# Patient Record
Sex: MALE | Race: WHITE | HISPANIC OR LATINO | Employment: UNEMPLOYED | URBAN - METROPOLITAN AREA
[De-identification: names, ages, dates, MRNs, and addresses within clinical notes are randomized per-mention and may not be internally consistent; named-entity substitution may affect disease eponyms.]

---

## 2023-02-14 ENCOUNTER — HOSPITAL ENCOUNTER (EMERGENCY)
Facility: HOSPITAL | Age: 28
Discharge: HOME | End: 2023-02-14
Attending: EMERGENCY MEDICINE | Admitting: EMERGENCY MEDICINE

## 2023-02-14 VITALS
DIASTOLIC BLOOD PRESSURE: 84 MMHG | OXYGEN SATURATION: 98 % | HEART RATE: 103 BPM | WEIGHT: 195.99 LBS | RESPIRATION RATE: 16 BRPM | TEMPERATURE: 98.5 F | SYSTOLIC BLOOD PRESSURE: 152 MMHG

## 2023-02-14 DIAGNOSIS — Z48.02 ENCOUNTER FOR STAPLE REMOVAL: Primary | ICD-10-CM

## 2023-02-14 NOTE — ED PROVIDER NOTES
History  Chief Complaint   Patient presents with   • Suture / Staple Removal     States he needs staples removed from his head     33 y/o male, 5 staples in right scalp  Placed 1/28 at [facility]  No fevers, chills, purulence, headaches  Requesting removal see procedure note          None       History reviewed  No pertinent past medical history  History reviewed  No pertinent surgical history  History reviewed  No pertinent family history  I have reviewed and agree with the history as documented  E-Cigarette/Vaping   • E-Cigarette Use Never User      E-Cigarette/Vaping Substances     Social History     Tobacco Use   • Smoking status: Never     Passive exposure: Never   • Smokeless tobacco: Never   Vaping Use   • Vaping Use: Never used   Substance Use Topics   • Alcohol use: Yes     Comment: EOD   • Drug use: Never       Review of Systems   Constitutional: Negative for chills, fatigue and fever  HENT: Negative for congestion, ear pain, rhinorrhea and sore throat  Eyes: Negative for redness  Respiratory: Negative for chest tightness and shortness of breath  Cardiovascular: Negative for chest pain and palpitations  Gastrointestinal: Negative for abdominal pain, nausea and vomiting  Genitourinary: Negative for dysuria and hematuria  Musculoskeletal: Negative  Skin: Positive for wound  Negative for rash  Neurological: Negative for dizziness, syncope, light-headedness and numbness  Physical Exam  Physical Exam  Vitals and nursing note reviewed  Constitutional:       Appearance: Normal appearance  He is well-developed  HENT:      Head: Normocephalic and atraumatic  Eyes:      General: No scleral icterus  Pupils: Pupils are equal, round, and reactive to light  Cardiovascular:      Rate and Rhythm: Normal rate and regular rhythm  Pulses: Normal pulses  Pulmonary:      Effort: Pulmonary effort is normal  No respiratory distress  Breath sounds: No stridor  Abdominal:      General: There is no distension  Palpations: There is no mass  Musculoskeletal:      Cervical back: Normal range of motion  Skin:     General: Skin is warm and dry  Capillary Refill: Capillary refill takes less than 2 seconds  Coloration: Skin is not jaundiced  Neurological:      Mental Status: He is alert and oriented to person, place, and time  Gait: Gait normal    Psychiatric:         Mood and Affect: Mood normal          Vital Signs  ED Triage Vitals [02/14/23 1054]   Temperature Pulse Respirations Blood Pressure SpO2   98 5 °F (36 9 °C) 103 16 152/84 98 %      Temp Source Heart Rate Source Patient Position - Orthostatic VS BP Location FiO2 (%)   Tympanic Monitor Sitting Left arm --      Pain Score       --           Vitals:    02/14/23 1054   BP: 152/84   Pulse: 103   Patient Position - Orthostatic VS: Sitting         Visual Acuity      ED Medications  Medications - No data to display    Diagnostic Studies  Results Reviewed     None                 No orders to display              Procedures  Suture removal    Date/Time: 2/14/2023 11:16 AM  Performed by: José Luis Noel PA-C  Authorized by: José Luis Noel PA-C   Universal Protocol:  Consent: Verbal consent obtained  Consent given by: patient  Patient understanding: patient states understanding of the procedure being performed  Patient identity confirmed: verbally with patient        Patient location:  ED  Location:     Laterality:  Right    Location:  [address] location:  Scalp  Procedure details: Tools used: Other (comment)    Wound appearance:  No sign(s) of infection, good wound healing and clean    Number of staples removed:  5  Post-procedure details:     Post-removal:  No dressing applied    Patient tolerance of procedure:   Tolerated well, no immediate complications             ED Course                               SBIRT 20yo+    Flowsheet Row Most Recent Value   SBIRT (25 yo +) In order to provide better care to our patients, we are screening all of our patients for alcohol and drug use  Would it be okay to ask you these screening questions? No Filed at: 02/14/2023 1102                    Medical Decision Making  5 staples removed from scalp  See procedure note  No evidence of infection  Strict return to ED precautions discussed  Patient and/or family members verbalizes understanding and agrees with plan  Patient is stable for discharge     Portions of the record may have been created with voice recognition software  Occasional wrong word or "sound a like" substitutions may have occurred due to the inherent limitations of voice recognition software  Read the chart carefully and recognize, using context, where substitutions have occurred  Encounter for staple removal: acute illness or injury      Disposition  Final diagnoses:   Encounter for staple removal     Time reflects when diagnosis was documented in both MDM as applicable and the Disposition within this note     Time User Action Codes Description Comment    2/14/2023 11:12 AM Rodney Shen Add [W14 67] Encounter for staple removal       ED Disposition     ED Disposition   Discharge    Condition   Good    Date/Time   Tue Feb 14, 2023 11:12 AM    Comment   Zackary Perez discharge to home/self care  Follow-up Information     Follow up With Specialties Details Why Contact Info    Tea Champagne MD Family Medicine Schedule an appointment as soon as possible for a visit  As needed [address]  [phone]            There are no discharge medications for this patient  No discharge procedures on file      PDMP Review     None          ED Provider  Electronically Signed by           Stephany Smith PA-C  02/14/23 8932

## 2023-02-14 NOTE — ED ATTENDING ATTESTATION
I was the attending physician on duty at the time the patient visited the emergency department  The patient was evaluated by the Advanced Practitioner  I was personally available for consultation; however the patient’s care, medical decisions and disposition fell within the Advanced Practitioner’s scope of practice to independently manage the patient, unless otherwise noted      Jeni Mohr MD

## 2023-06-08 ENCOUNTER — OFFICE VISIT (OUTPATIENT)
Dept: FAMILY MEDICINE CLINIC | Facility: CLINIC | Age: 28
End: 2023-06-08

## 2023-06-08 VITALS
BODY MASS INDEX: 31.33 KG/M2 | DIASTOLIC BLOOD PRESSURE: 80 MMHG | HEIGHT: 67 IN | HEART RATE: 92 BPM | SYSTOLIC BLOOD PRESSURE: 124 MMHG | OXYGEN SATURATION: 99 % | WEIGHT: 199.6 LBS | TEMPERATURE: 98 F | RESPIRATION RATE: 18 BRPM

## 2023-06-08 DIAGNOSIS — E66.09 CLASS 1 OBESITY DUE TO EXCESS CALORIES WITHOUT SERIOUS COMORBIDITY WITH BODY MASS INDEX (BMI) OF 31.0 TO 31.9 IN ADULT: ICD-10-CM

## 2023-06-08 DIAGNOSIS — Z02.4 DRIVER'S PERMIT PE (PHYSICAL EXAMINATION): Primary | ICD-10-CM

## 2023-06-08 PROCEDURE — 99203 OFFICE O/P NEW LOW 30 MIN: CPT | Performed by: FAMILY MEDICINE

## 2023-06-08 NOTE — PROGRESS NOTES
Name: Hussein Em      : 1995      MRN: [id number]  Encounter Provider: Kit Patel MD  Encounter Date: 2023   Encounter department: [address] [place] [address]  's permit PE (physical examination)  Assessment & Plan:  -No contraindication for driving  -Safe driving counseling provided      2  Class 1 obesity due to excess calories without serious comorbidity with body mass index (BMI) of 31 0 to 31 9 in adult  Assessment & Plan:  -Dietary and exercise counseling provided      BMI Counseling: Body mass index is 31 26 kg/m²  The BMI is above normal  Nutrition recommendations include decreasing portion sizes, encouraging healthy choices of fruits and vegetables, decreasing fast food intake, consuming healthier snacks, limiting drinks that contain sugar, moderation in carbohydrate intake and reducing intake of cholesterol  Exercise recommendations include moderate physical activity 150 minutes/week and obtaining a gym membership  Rationale for BMI follow-up plan is due to patient being overweight or obese  Depression Screening and Follow-up Plan: Patient was screened for depression during today's encounter  They screened negative with a PHQ-2 score of 0  Subjective     31 y/o male with no significant PMH who presents today to establish care and for  physical  He has no acute concerns today  He denies any history of psychiatry disorder or epilepsy  He denies any history of drug and alcohol abuse  Review of Systems   Constitutional: Negative for appetite change, chills, fatigue and fever  HENT: Negative for congestion, ear discharge and ear pain  Eyes: Negative for pain and redness  Respiratory: Negative for cough, shortness of breath and wheezing  Cardiovascular: Negative for chest pain, palpitations and leg swelling     Gastrointestinal: Negative for abdominal distention, abdominal pain, blood in stool, "constipation, diarrhea, nausea and vomiting  Endocrine: Negative for polydipsia, polyphagia and polyuria  Genitourinary: Negative for difficulty urinating, dysuria, frequency, hematuria and urgency  Musculoskeletal: Negative for arthralgias, back pain, myalgias and neck pain  Neurological: Negative for dizziness, tremors, weakness, light-headedness, numbness and headaches  History reviewed  No pertinent past medical history  History reviewed  No pertinent surgical history  Family History   Problem Relation Age of Onset   • No Known Problems Mother    • No Known Problems Father    • No Known Problems Sister    • No Known Problems Brother    • No Known Problems Son      Social History     Socioeconomic History   • Marital status: Single     Spouse name: None   • Number of children: None   • Years of education: None   • Highest education level: None   Occupational History   • None   Tobacco Use   • Smoking status: Never     Passive exposure: Never   • Smokeless tobacco: Never   Vaping Use   • Vaping Use: Never used   Substance and Sexual Activity   • Alcohol use: Yes     Comment: EOD   • Drug use: Never   • Sexual activity: None   Other Topics Concern   • None   Social History Narrative   • None     Social Determinants of Health     Financial Resource Strain: Not on file   Food Insecurity: Not on file   Transportation Needs: Not on file   Physical Activity: Not on file   Stress: Not on file   Social Connections: Not on file   Intimate Partner Violence: Not on file   Housing Stability: Not on file     No current outpatient medications on file prior to visit  No Known Allergies    There is no immunization history on file for this patient      Objective     /80 (BP Location: Right arm, Patient Position: Sitting, Cuff Size: Standard)   Pulse 92   Temp 98 °F (36 7 °C) (Temporal)   Resp 18   Ht 5' 7\" (1 702 m)   Wt 90 5 kg (199 lb 9 6 oz)   SpO2 99%   BMI 31 26 kg/m²     Physical " Exam  Constitutional:       General: He is not in acute distress  Appearance: Normal appearance  He is well-developed  He is not ill-appearing, toxic-appearing or diaphoretic  HENT:      Head: Normocephalic and atraumatic  Right Ear: External ear normal       Left Ear: External ear normal       Nose: Nose normal       Mouth/Throat:      Pharynx: No oropharyngeal exudate or posterior oropharyngeal erythema  Eyes:      General: No scleral icterus  Right eye: No discharge  Left eye: No discharge  Extraocular Movements: Extraocular movements intact  Conjunctiva/sclera: Conjunctivae normal    Cardiovascular:      Rate and Rhythm: Normal rate and regular rhythm  Heart sounds: Normal heart sounds  No murmur heard  No friction rub  No gallop  Pulmonary:      Effort: Pulmonary effort is normal  No respiratory distress  Breath sounds: Normal breath sounds  No stridor  No wheezing or rhonchi  Abdominal:      General: Bowel sounds are normal  There is no distension  Palpations: Abdomen is soft  There is no mass  Tenderness: There is no abdominal tenderness  There is no guarding or rebound  Hernia: No hernia is present  Musculoskeletal:         General: No deformity or signs of injury  Normal range of motion  Cervical back: Normal range of motion and neck supple  Right lower leg: No edema  Left lower leg: No edema  Lymphadenopathy:      Cervical: No cervical adenopathy  Skin:     General: Skin is warm  Capillary Refill: Capillary refill takes less than 2 seconds  Findings: No bruising, erythema, lesion or rash  Neurological:      General: No focal deficit present  Mental Status: He is alert and oriented to person, place, and time  Cranial Nerves: No cranial nerve deficit  Sensory: No sensory deficit  Motor: No weakness        Coordination: Coordination normal    Psychiatric:         Mood and Affect: Mood normal        Kal Hagan MD

## 2023-06-28 ENCOUNTER — HOSPITAL ENCOUNTER (INPATIENT)
Facility: HOSPITAL | Age: 28
LOS: 2 days | Discharge: HOME | End: 2023-06-30
Attending: INTERNAL MEDICINE | Admitting: EMERGENCY MEDICINE
Payer: COMMERCIAL

## 2023-06-28 DIAGNOSIS — F10.10 ALCOHOL ABUSE: Primary | ICD-10-CM

## 2023-06-28 PROBLEM — F10.939 ALCOHOL WITHDRAWAL SYNDROME WITH COMPLICATION (HCC): Status: ACTIVE | Noted: 2023-06-28

## 2023-06-28 PROBLEM — F32.A DEPRESSION: Status: ACTIVE | Noted: 2023-06-28

## 2023-06-28 PROBLEM — F10.929 ALCOHOL INTOXICATION (HCC): Status: ACTIVE | Noted: 2023-06-28

## 2023-06-28 PROBLEM — F10.20 ALCOHOL USE DISORDER, SEVERE, DEPENDENCE (HCC): Status: ACTIVE | Noted: 2023-06-28

## 2023-06-28 PROBLEM — R79.89 ELEVATED LFTS: Status: ACTIVE | Noted: 2023-06-28

## 2023-06-28 LAB
ALBUMIN SERPL BCP-MCNC: 4.2 G/DL (ref 3.5–5)
ALP SERPL-CCNC: 69 U/L (ref 34–104)
ALT SERPL W P-5'-P-CCNC: 53 U/L (ref 7–52)
AMPHETAMINES SERPL QL SCN: NEGATIVE
ANION GAP SERPL CALCULATED.3IONS-SCNC: 12 MMOL/L
APAP SERPL-MCNC: <10 UG/ML (ref 10–20)
AST SERPL W P-5'-P-CCNC: 64 U/L (ref 13–39)
ATRIAL RATE: 103 BPM
BARBITURATES UR QL: NEGATIVE
BASOPHILS # BLD AUTO: 0.05 THOUSANDS/ÂΜL (ref 0–0.1)
BASOPHILS NFR BLD AUTO: 1 % (ref 0–1)
BENZODIAZ UR QL: NEGATIVE
BILIRUB SERPL-MCNC: 0.29 MG/DL (ref 0.2–1)
BUN SERPL-MCNC: 11 MG/DL (ref 5–25)
CALCIUM SERPL-MCNC: 8.5 MG/DL (ref 8.4–10.2)
CHLORIDE SERPL-SCNC: 109 MMOL/L (ref 96–108)
CO2 SERPL-SCNC: 24 MMOL/L (ref 21–32)
COCAINE UR QL: NEGATIVE
CREAT SERPL-MCNC: 0.8 MG/DL (ref 0.6–1.3)
EOSINOPHIL # BLD AUTO: 0.05 THOUSAND/ÂΜL (ref 0–0.61)
EOSINOPHIL NFR BLD AUTO: 1 % (ref 0–6)
ERYTHROCYTE [DISTWIDTH] IN BLOOD BY AUTOMATED COUNT: 13.1 % (ref 11.6–15.1)
ETHANOL SERPL-MCNC: 462 MG/DL
GFR SERPL CREATININE-BSD FRML MDRD: 122 ML/MIN/1.73SQ M
GLUCOSE SERPL-MCNC: 115 MG/DL (ref 65–140)
HCT VFR BLD AUTO: 45.9 % (ref 36.5–49.3)
HGB BLD-MCNC: 15.2 G/DL (ref 12–17)
IMM GRANULOCYTES # BLD AUTO: 0.02 THOUSAND/UL (ref 0–0.2)
IMM GRANULOCYTES NFR BLD AUTO: 0 % (ref 0–2)
LYMPHOCYTES # BLD AUTO: 2.84 THOUSANDS/ÂΜL (ref 0.6–4.47)
LYMPHOCYTES NFR BLD AUTO: 38 % (ref 14–44)
MAGNESIUM SERPL-MCNC: 2.1 MG/DL (ref 1.9–2.7)
MCH RBC QN AUTO: 31.8 PG (ref 26.8–34.3)
MCHC RBC AUTO-ENTMCNC: 33.1 G/DL (ref 31.4–37.4)
MCV RBC AUTO: 96 FL (ref 82–98)
METHADONE UR QL: NEGATIVE
MONOCYTES # BLD AUTO: 0.7 THOUSAND/ÂΜL (ref 0.17–1.22)
MONOCYTES NFR BLD AUTO: 9 % (ref 4–12)
NEUTROPHILS # BLD AUTO: 3.83 THOUSANDS/ÂΜL (ref 1.85–7.62)
NEUTS SEG NFR BLD AUTO: 51 % (ref 43–75)
NRBC BLD AUTO-RTO: 0 /100 WBCS
OPIATES UR QL SCN: NEGATIVE
OXYCODONE+OXYMORPHONE UR QL SCN: NEGATIVE
P AXIS: 58 DEGREES
PCP UR QL: NEGATIVE
PLATELET # BLD AUTO: 236 THOUSANDS/UL (ref 149–390)
PMV BLD AUTO: 9.4 FL (ref 8.9–12.7)
POTASSIUM SERPL-SCNC: 3.7 MMOL/L (ref 3.5–5.3)
PR INTERVAL: 136 MS
PROT SERPL-MCNC: 6.2 G/DL (ref 6.4–8.4)
QRS AXIS: 34 DEGREES
QRSD INTERVAL: 98 MS
QT INTERVAL: 324 MS
QTC INTERVAL: 424 MS
RBC # BLD AUTO: 4.78 MILLION/UL (ref 3.88–5.62)
SALICYLATES SERPL-MCNC: <5 MG/DL (ref 3–20)
SODIUM SERPL-SCNC: 145 MMOL/L (ref 135–147)
T WAVE AXIS: 19 DEGREES
THC UR QL: NEGATIVE
VENTRICULAR RATE: 103 BPM
WBC # BLD AUTO: 7.49 THOUSAND/UL (ref 4.31–10.16)

## 2023-06-28 PROCEDURE — 96376 TX/PRO/DX INJ SAME DRUG ADON: CPT

## 2023-06-28 PROCEDURE — HZ2ZZZZ DETOXIFICATION SERVICES FOR SUBSTANCE ABUSE TREATMENT: ICD-10-PCS | Performed by: EMERGENCY MEDICINE

## 2023-06-28 PROCEDURE — 96365 THER/PROPH/DIAG IV INF INIT: CPT

## 2023-06-28 PROCEDURE — 99285 EMERGENCY DEPT VISIT HI MDM: CPT

## 2023-06-28 PROCEDURE — 99223 1ST HOSP IP/OBS HIGH 75: CPT

## 2023-06-28 PROCEDURE — 93005 ELECTROCARDIOGRAM TRACING: CPT

## 2023-06-28 PROCEDURE — 96367 TX/PROPH/DG ADDL SEQ IV INF: CPT

## 2023-06-28 PROCEDURE — 80307 DRUG TEST PRSMV CHEM ANLYZR: CPT

## 2023-06-28 PROCEDURE — 93010 ELECTROCARDIOGRAM REPORT: CPT | Performed by: INTERNAL MEDICINE

## 2023-06-28 PROCEDURE — 82077 ASSAY SPEC XCP UR&BREATH IA: CPT

## 2023-06-28 PROCEDURE — 83735 ASSAY OF MAGNESIUM: CPT

## 2023-06-28 PROCEDURE — 85025 COMPLETE CBC W/AUTO DIFF WBC: CPT

## 2023-06-28 PROCEDURE — 80143 DRUG ASSAY ACETAMINOPHEN: CPT

## 2023-06-28 PROCEDURE — 80053 COMPREHEN METABOLIC PANEL: CPT

## 2023-06-28 PROCEDURE — 80179 DRUG ASSAY SALICYLATE: CPT

## 2023-06-28 PROCEDURE — 36415 COLL VENOUS BLD VENIPUNCTURE: CPT

## 2023-06-28 PROCEDURE — 96375 TX/PRO/DX INJ NEW DRUG ADDON: CPT

## 2023-06-28 RX ORDER — LORAZEPAM 2 MG/ML
0.5 INJECTION INTRAMUSCULAR ONCE
Status: COMPLETED | OUTPATIENT
Start: 2023-06-28 | End: 2023-06-28

## 2023-06-28 RX ORDER — LORAZEPAM 2 MG/ML
1 INJECTION INTRAMUSCULAR ONCE
Status: COMPLETED | OUTPATIENT
Start: 2023-06-28 | End: 2023-06-28

## 2023-06-28 RX ORDER — SODIUM CHLORIDE, SODIUM GLUCONATE, SODIUM ACETATE, POTASSIUM CHLORIDE, MAGNESIUM CHLORIDE, SODIUM PHOSPHATE, DIBASIC, AND POTASSIUM PHOSPHATE .53; .5; .37; .037; .03; .012; .00082 G/100ML; G/100ML; G/100ML; G/100ML; G/100ML; G/100ML; G/100ML
100 INJECTION, SOLUTION INTRAVENOUS CONTINUOUS
Status: DISCONTINUED | OUTPATIENT
Start: 2023-06-28 | End: 2023-06-30

## 2023-06-28 RX ORDER — ENOXAPARIN SODIUM 100 MG/ML
40 INJECTION SUBCUTANEOUS DAILY
Status: DISCONTINUED | OUTPATIENT
Start: 2023-06-29 | End: 2023-06-30 | Stop reason: HOSPADM

## 2023-06-28 RX ORDER — ONDANSETRON 2 MG/ML
4 INJECTION INTRAMUSCULAR; INTRAVENOUS EVERY 6 HOURS PRN
Status: DISCONTINUED | OUTPATIENT
Start: 2023-06-28 | End: 2023-06-30 | Stop reason: HOSPADM

## 2023-06-28 RX ADMIN — LORAZEPAM 0.5 MG: 2 INJECTION INTRAMUSCULAR; INTRAVENOUS at 14:59

## 2023-06-28 RX ADMIN — FOLIC ACID 1 MG: 5 INJECTION, SOLUTION INTRAMUSCULAR; INTRAVENOUS; SUBCUTANEOUS at 15:15

## 2023-06-28 RX ADMIN — SODIUM CHLORIDE, SODIUM GLUCONATE, SODIUM ACETATE, POTASSIUM CHLORIDE, MAGNESIUM CHLORIDE, SODIUM PHOSPHATE, DIBASIC, AND POTASSIUM PHOSPHATE 100 ML/HR: .53; .5; .37; .037; .03; .012; .00082 INJECTION, SOLUTION INTRAVENOUS at 19:47

## 2023-06-28 RX ADMIN — THIAMINE HYDROCHLORIDE 500 MG: 100 INJECTION, SOLUTION INTRAMUSCULAR; INTRAVENOUS at 15:53

## 2023-06-28 RX ADMIN — SODIUM CHLORIDE 1000 ML: 0.9 INJECTION, SOLUTION INTRAVENOUS at 15:05

## 2023-06-28 RX ADMIN — LORAZEPAM 1 MG: 2 INJECTION INTRAMUSCULAR; INTRAVENOUS at 16:17

## 2023-06-28 NOTE — ED NOTES
Pt resting in hernandez bed with no s/s of distress observed  Virtual 1:1 maintained        Celestine Quiroz RN  06/28/23 0400

## 2023-06-28 NOTE — ED NOTES
Baldemar Márquez [phone]  Wishes to be contacted once transferred to Detox floor       Molly Wiggins RN  06/28/23 5411

## 2023-06-28 NOTE — ED PROVIDER NOTES
History  Chief Complaint   Patient presents with   • Detox Evaluation     Patient came in with [facility] requesting detox from alcohol  States he drinks a handle of vodka a day  Last drink 1 hour pta     Patient is a 42-year-old male coming in requesting detox from alcohol  Patient is presenting with his   States he has been drinking approximately a liter of vodka per day  Unable to get a clear story about how long he has been drinking, but does admit that he wants detox from alcohol and rehab  Patient is heavily intoxicated at this time, and unable to get a detailed history from, besides that he has no current complaints  Last drink approximately 1 hour ago      Detox Evaluation  Associated symptoms: no abdominal pain        None       History reviewed  No pertinent past medical history  History reviewed  No pertinent surgical history  Family History   Problem Relation Age of Onset   • No Known Problems Mother    • No Known Problems Father    • No Known Problems Sister    • No Known Problems Brother    • No Known Problems Son      I have reviewed and agree with the history as documented  E-Cigarette/Vaping   • E-Cigarette Use Never User      E-Cigarette/Vaping Substances     Social History     Tobacco Use   • Smoking status: Never     Passive exposure: Never   • Smokeless tobacco: Never   Vaping Use   • Vaping Use: Never used   Substance Use Topics   • Alcohol use: Yes     Comment: EOD   • Drug use: Never       Review of Systems   Unable to perform ROS: Other (Heavily Intoxicated)   Gastrointestinal: Negative for abdominal pain  Physical Exam  Physical Exam  Vitals reviewed  Constitutional:       Appearance: Normal appearance  He is normal weight  HENT:      Head: Normocephalic and atraumatic        Comments: No abrasions or bruises noted on head     Right Ear: External ear normal       Left Ear: External ear normal       Nose: Nose normal    Eyes:      Conjunctiva/sclera: Conjunctivae normal    Cardiovascular:      Rate and Rhythm: Normal rate  Pulmonary:      Effort: Pulmonary effort is normal    Musculoskeletal:         General: Normal range of motion  Cervical back: Normal range of motion  Skin:     General: Skin is warm and dry  Neurological:      Mental Status: He is alert           Vital Signs  ED Triage Vitals [06/28/23 1427]   Temperature Pulse Respirations Blood Pressure SpO2   98 5 °F (36 9 °C) (!) 121 22 136/89 96 %      Temp Source Heart Rate Source Patient Position - Orthostatic VS BP Location FiO2 (%)   Oral Monitor Lying Left arm --      Pain Score       --           Vitals:    06/28/23 1427 06/28/23 1615 06/28/23 1848 06/28/23 2000   BP: 136/89 126/87 113/71 118/63   Pulse: (!) 121 102 80 100   Patient Position - Orthostatic VS: Lying Lying Lying Lying         Visual Acuity      ED Medications  Medications   enoxaparin (LOVENOX) subcutaneous injection 40 mg (has no administration in time range)   folic acid 1 mg, thiamine (VITAMIN B1) 100 mg in sodium chloride 0 9 % 100 mL IV piggyback (has no administration in time range)   multivitamin-minerals (CENTRUM) tablet 1 tablet (has no administration in time range)   ondansetron (ZOFRAN) injection 4 mg (has no administration in time range)   multi-electrolyte (PLASMALYTE-A/ISOLYTE-S PH 7 4) IV solution (100 mL/hr Intravenous New Bag 6/28/23 1947)   sodium chloride 0 9 % bolus 1,000 mL (0 mL Intravenous Stopped 6/28/23 1826)   thiamine (VITAMIN B1) 500 mg in sodium chloride 0 9 % 50 mL IVPB (0 mg Intravenous Stopped 8/48/51 9917)   folic acid 1 mg in sodium chloride 0 9 % 50 mL IVPB (0 mg Intravenous Stopped 6/28/23 1550)   LORazepam (ATIVAN) injection 0 5 mg (0 5 mg Intravenous Given 6/28/23 1459)   LORazepam (ATIVAN) injection 1 mg (1 mg Intravenous Given 6/28/23 1617)       Diagnostic Studies  Results Reviewed     Procedure Component Value Units Date/Time    Rapid drug screen, urine [[id number]]  (Normal) Collected: 06/28/23 1511    Lab Status: Final result Specimen: Urine, Other Updated: 06/28/23 1533     Amph/Meth UR Negative     Barbiturate Ur Negative     Benzodiazepine Urine Negative     Cocaine Urine Negative     Methadone Urine Negative     Opiate Urine Negative     PCP Ur Negative     THC Urine Negative     Oxycodone Urine Negative    Narrative:      FOR MEDICAL PURPOSES ONLY  IF CONFIRMATION NEEDED PLEASE CONTACT THE LAB WITHIN 5 DAYS      Drug Screen Cutoff Levels:  AMPHETAMINE/METHAMPHETAMINES  1000 ng/mL  BARBITURATES     200 ng/mL  BENZODIAZEPINES     200 ng/mL  COCAINE      300 ng/mL  METHADONE      300 ng/mL  OPIATES      300 ng/mL  PHENCYCLIDINE     25 ng/mL  THC       50 ng/mL  OXYCODONE      100 ng/mL    Ethanol [[id number]]  (Abnormal) Collected: 06/28/23 1446    Lab Status: Final result Specimen: Blood from Line, Venous Updated: 06/28/23 1510     Ethanol Lvl 462 mg/dL     Magnesium [[id number]]  (Normal) Collected: 06/28/23 1446    Lab Status: Final result Specimen: Blood from Line, Venous Updated: 06/28/23 1509     Magnesium 2 1 mg/dL     Comprehensive metabolic panel [[id number]]  (Abnormal) Collected: 06/28/23 1446    Lab Status: Final result Specimen: Blood from Line, Venous Updated: 06/28/23 1509     Sodium 145 mmol/L      Potassium 3 7 mmol/L      Chloride 109 mmol/L      CO2 24 mmol/L      ANION GAP 12 mmol/L      BUN 11 mg/dL      Creatinine 0 80 mg/dL      Glucose 115 mg/dL      Calcium 8 5 mg/dL      AST 64 U/L      ALT 53 U/L      Alkaline Phosphatase 69 U/L      Total Protein 6 2 g/dL      Albumin 4 2 g/dL      Total Bilirubin 0 29 mg/dL      eGFR 122 ml/min/1 73sq m     Narrative:      [facility] guidelines for Chronic Kidney Disease (CKD):   •  Stage 1 with normal or high GFR (GFR > 90 mL/min/1 73 square meters)  •  Stage 2 Mild CKD (GFR = 60-89 mL/min/1 73 square meters)  •  Stage 3A Moderate CKD (GFR = 45-59 mL/min/1 73 square meters)  •  Stage 3B Moderate CKD (GFR = 30-44 mL/min/1 73 square meters)  •  Stage 4 Severe CKD (GFR = 15-29 mL/min/1 73 square meters)  •  Stage 5 End Stage CKD (GFR <15 mL/min/1 73 square meters)  Note: GFR calculation is accurate only with a steady state creatinine    Salicylate level [[id number]]  (Normal) Collected: 06/28/23 1446    Lab Status: Final result Specimen: Blood from Line, Venous Updated: 89/47/67 8477     Salicylate Lvl <5 mg/dL     Acetaminophen level-If concentration is detectable, please discuss with medical  on call  [[id number]]  (Abnormal) Collected: 06/28/23 1446    Lab Status: Final result Specimen: Blood from Line, Venous Updated: 06/28/23 1509     Acetaminophen Level <10 ug/mL     CBC and differential [[id number]] Collected: 06/28/23 1446    Lab Status: Final result Specimen: Blood from Line, Venous Updated: 06/28/23 1456     WBC 7 49 Thousand/uL      RBC 4 78 Million/uL      Hemoglobin 15 2 g/dL      Hematocrit 45 9 %      MCV 96 fL      MCH 31 8 pg      MCHC 33 1 g/dL      RDW 13 1 %      MPV 9 4 fL      Platelets 760 Thousands/uL      nRBC 0 /100 WBCs      Neutrophils Relative 51 %      Immat GRANS % 0 %      Lymphocytes Relative 38 %      Monocytes Relative 9 %      Eosinophils Relative 1 %      Basophils Relative 1 %      Neutrophils Absolute 3 83 Thousands/µL      Immature Grans Absolute 0 02 Thousand/uL      Lymphocytes Absolute 2 84 Thousands/µL      Monocytes Absolute 0 70 Thousand/µL      Eosinophils Absolute 0 05 Thousand/µL      Basophils Absolute 0 05 Thousands/µL                  No orders to display              Procedures  Procedures         ED Course  ED Course as of 06/28/23 2001 Wed Jun 28, 2023   1512 MEDICAL ALCOHOL(!): 462   1603 Continues to get up and is a potential danger to himself at this time                               SBIRT 20yo+    Flowsheet Row Most Recent Value   Initial Alcohol Screen: US AUDIT-C     1  How often do you have a drink containing alcohol? 6 Filed at: 06/28/2023 1606   2   How many drinks containing alcohol do you have on a typical day you are drinking? 6 Filed at: 06/28/2023 1606   3a  Male UNDER 65: How often do you have five or more drinks on one occasion? 6 Filed at: 06/28/2023 1606   3b  FEMALE Any Age, or MALE 65+: How often do you have 4 or more drinks on one occassion? 0 Filed at: 06/28/2023 1606   Audit-C Score 18 Filed at: 06/28/2023 1606   Full Alcohol Screen: US AUDIT    4  How often during the last year have you found that you were not able to stop drinking once you had started? 4 Filed at: 06/28/2023 1606   5  How often during past year have you failed to do what was normally expected of you because of drinking? 4 Filed at: 06/28/2023 1606   6  How often in past year have you needed a first drink in the morning to get yourself going after a heavy drinking session? 4 Filed at: 06/28/2023 1606   7  How often in past year have you had feeling of guilt or remorse after drinking? 4 Filed at: 06/28/2023 1606   8  How often in past year have you been unable to remember what happened night before because you had been drinking? 4 Filed at: 06/28/2023 1606   9  Have you or someone else been injured as a result of your drinking? 4 Filed at: 06/28/2023 1606   10  Has a relative, friend, doctor or other health worker been concerned about your drinking and suggested you cut down? 4 Filed at: 06/28/2023 1606   AUDIT Total Score 46 Filed at: 06/28/2023 1606   BROCK: How many times in the past year have you    Used an illegal drug or used a prescription medication for non-medical reasons? Never Filed at: 06/28/2023 [address] [facility] [address] Making  Patient is a 51-year-old male coming in for evaluation of alcohol detox  Is in no acute distress at this time  Patient labs within normal limits, admitted to detox for further evaluation and treatment    Amount and/or Complexity of Data Reviewed  Labs: ordered   Decision-making details documented in ED Course  Risk  Prescription drug management  Decision regarding hospitalization  Disposition  Final diagnoses:   Alcohol abuse     Time reflects when diagnosis was documented in both MDM as applicable and the Disposition within this note     Time User Action Codes Description Comment    6/28/2023  4:29 PM Janette Pal Add [F10 10] Alcohol abuse       ED Disposition     ED Disposition   Admit    Condition   Stable    Date/Time   Wed Jun 28, 2023  4:29 PM    Comment   Case was discussed with Phoebe Burkitt, CRNP and the patient's admission status was agreed to be Admission Status: inpatient status to the service of Dr All Martines   Follow-up Information    None         There are no discharge medications for this patient  No discharge procedures on file      PDMP Review     None          ED Provider  Electronically Signed by           Kyleigh Shelby PA-C  06/28/23 2001

## 2023-06-28 NOTE — ASSESSMENT & PLAN NOTE
Recent Labs     06/29/23  0533   AST 48*   ALT 41   ALKPHOS 38   TBILI 0 53     · Mild  · In setting of chronic alcohol use  · Currently denies acute abdominal pain  · Continue to monitor   · Encourage alcohol cessation

## 2023-06-28 NOTE — ASSESSMENT & PLAN NOTE
Patient with h/o chronic alcohol use  Currently consumes 1 handle of vodka daily  Last drink the evening of 6/28  Serum ethanol 462 (6/28/2023 1446)  Continue daily thiamine/folic acid supplementation  Manage withdrawal as above   Consult case management for assistance with disposition planning

## 2023-06-28 NOTE — ED NOTES
Pt removed IV and handed to this nurse  This nurse explained that he needs an IV to go to detox floor, Pt agreeable to placement       Cher Motley RN  06/28/23 5424

## 2023-06-28 NOTE — ASSESSMENT & PLAN NOTE
Patient with a history of chronic daily alcohol use  Last drink the evening of 6/28  Serum alcohol 462 in the ED  Received 1 5 mg lorazepam in the ED PTA  Continue [facility] protocol for medical management of alcohol withdrawal  Currently denies any acute withdrawal symptoms  Initial dose of phenobarbital held due to high alcohol level and lack of withdrawal symptoms     Continue monitoring under protocol and administer phenobarbital as indicated  Continuous pulse ox and telemetry monitoring
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
## 2023-06-28 NOTE — ASSESSMENT & PLAN NOTE
· Serum ethanol 462 (6/28/2023 1446)  · Appears clinically intoxicated currently   · Continue to monitor and manage withdrawal as above

## 2023-06-28 NOTE — ASSESSMENT & PLAN NOTE
· Reports recent decrease in mood due to increased alcohol use  · Denies SI/HI  · No continual observation indicated at this time  · Has never been treated for depression in the past  · Continue to monitor

## 2023-06-29 LAB
ALBUMIN SERPL BCP-MCNC: 3.3 G/DL (ref 3.5–5)
ALP SERPL-CCNC: 38 U/L (ref 34–104)
ALT SERPL W P-5'-P-CCNC: 41 U/L (ref 7–52)
ANION GAP SERPL CALCULATED.3IONS-SCNC: 10 MMOL/L
AST SERPL W P-5'-P-CCNC: 48 U/L (ref 13–39)
BILIRUB SERPL-MCNC: 0.53 MG/DL (ref 0.2–1)
BUN SERPL-MCNC: 9 MG/DL (ref 5–25)
CALCIUM ALBUM COR SERPL-MCNC: 8.7 MG/DL (ref 8.3–10.1)
CALCIUM SERPL-MCNC: 8.1 MG/DL (ref 8.4–10.2)
CHLORIDE SERPL-SCNC: 106 MMOL/L (ref 96–108)
CO2 SERPL-SCNC: 26 MMOL/L (ref 21–32)
CREAT SERPL-MCNC: 0.64 MG/DL (ref 0.6–1.3)
ERYTHROCYTE [DISTWIDTH] IN BLOOD BY AUTOMATED COUNT: 12.9 % (ref 11.6–15.1)
GFR SERPL CREATININE-BSD FRML MDRD: 134 ML/MIN/1.73SQ M
GLUCOSE SERPL-MCNC: 84 MG/DL (ref 65–140)
HCT VFR BLD AUTO: 38.2 % (ref 36.5–49.3)
HGB BLD-MCNC: 12.5 G/DL (ref 12–17)
MAGNESIUM SERPL-MCNC: 1.8 MG/DL (ref 1.9–2.7)
MCH RBC QN AUTO: 31.7 PG (ref 26.8–34.3)
MCHC RBC AUTO-ENTMCNC: 32.7 G/DL (ref 31.4–37.4)
MCV RBC AUTO: 97 FL (ref 82–98)
PLATELET # BLD AUTO: 196 THOUSANDS/UL (ref 149–390)
PMV BLD AUTO: 10.2 FL (ref 8.9–12.7)
POTASSIUM SERPL-SCNC: 3.9 MMOL/L (ref 3.5–5.3)
PROT SERPL-MCNC: 5.1 G/DL (ref 6.4–8.4)
RBC # BLD AUTO: 3.94 MILLION/UL (ref 3.88–5.62)
SODIUM SERPL-SCNC: 142 MMOL/L (ref 135–147)
WBC # BLD AUTO: 5.62 THOUSAND/UL (ref 4.31–10.16)

## 2023-06-29 PROCEDURE — 99232 SBSQ HOSP IP/OBS MODERATE 35: CPT

## 2023-06-29 PROCEDURE — 80053 COMPREHEN METABOLIC PANEL: CPT | Performed by: PHYSICIAN ASSISTANT

## 2023-06-29 PROCEDURE — 83735 ASSAY OF MAGNESIUM: CPT | Performed by: PHYSICIAN ASSISTANT

## 2023-06-29 PROCEDURE — 85027 COMPLETE CBC AUTOMATED: CPT | Performed by: PHYSICIAN ASSISTANT

## 2023-06-29 RX ADMIN — SODIUM CHLORIDE, SODIUM GLUCONATE, SODIUM ACETATE, POTASSIUM CHLORIDE, MAGNESIUM CHLORIDE, SODIUM PHOSPHATE, DIBASIC, AND POTASSIUM PHOSPHATE 100 ML/HR: .53; .5; .37; .037; .03; .012; .00082 INJECTION, SOLUTION INTRAVENOUS at 19:54

## 2023-06-29 RX ADMIN — FOLIC ACID: 5 INJECTION, SOLUTION INTRAMUSCULAR; INTRAVENOUS; SUBCUTANEOUS at 08:36

## 2023-06-29 RX ADMIN — MULTIPLE VITAMINS W/ MINERALS TAB 1 TABLET: TAB ORAL at 08:36

## 2023-06-29 NOTE — PLAN OF CARE
Problem: PAIN - ADULT  Goal: Verbalizes/displays adequate comfort level or baseline comfort level  Description: Interventions:  - Encourage patient to monitor pain and request assistance  - Assess pain using appropriate pain scale  - Administer analgesics based on type and severity of pain and evaluate response  - Implement non-pharmacological measures as appropriate and evaluate response  - Consider cultural and social influences on pain and pain management  - Notify physician/advanced practitioner if interventions unsuccessful or patient reports new pain  Outcome: Progressing     Problem: INFECTION - ADULT  Goal: Absence or prevention of progression during hospitalization  Description: INTERVENTIONS:  - Assess and monitor for signs and symptoms of infection  - Monitor lab/diagnostic results  - Monitor all insertion sites, i e  indwelling lines, tubes, and drains  - Monitor endotracheal if appropriate and nasal secretions for changes in amount and color  - [place] appropriate cooling/warming therapies per order  - Administer medications as ordered  - Instruct and encourage patient and family to use good hand hygiene technique  - Identify and instruct in appropriate isolation precautions for identified infection/condition  Outcome: Progressing  Goal: Absence of fever/infection during neutropenic period  Description: INTERVENTIONS:  - Monitor WBC    Outcome: Progressing     Problem: DISCHARGE PLANNING  Goal: Discharge to home or other facility with appropriate resources  Description: INTERVENTIONS:  - Identify barriers to discharge w/patient and caregiver  - Arrange for needed discharge resources and transportation as appropriate  - Identify discharge learning needs (meds, wound care, etc )  - Arrange for interpretive services to assist at discharge as needed  - Refer to Case Management Department for coordinating discharge planning if the patient needs post-hospital services based on physician/advanced practitioner order or complex needs related to functional status, cognitive ability, or social support system  Outcome: Progressing     Problem: Knowledge Deficit  Goal: Patient/family/caregiver demonstrates understanding of disease process, treatment plan, medications, and discharge instructions  Description: Complete learning assessment and assess knowledge base    Interventions:  - Provide teaching at level of understanding  - Provide teaching via preferred learning methods  Outcome: Progressing

## 2023-06-29 NOTE — CASE MANAGEMENT
Oscar Mckeon called from [facility], she brought pt to the ED yesterday afternoon  Worker provided her with update, she is in agreement with pt pursuing inpatient rehab on discharge

## 2023-06-29 NOTE — UTILIZATION REVIEW
Initial Clinical Review    Pt presented to [address] [place] [address] for its Level IV medically managed intensive inpatient detox unit  Admission: Date/Time/Statement:   Admission Orders (From admission, onward)     Ordered        06/28/23 1631  INPATIENT ADMISSION  Once                      Orders Placed This Encounter   Procedures   • INPATIENT ADMISSION     Standing Status:   Standing     Number of Occurrences:   1     Order Specific Question:   Level of Care     Answer:   Med Surg [16]     Order Specific Question:   Estimated length of stay     Answer:   More than 2 Midnights     Order Specific Question:   Certification     Answer:   I certify that inpatient services are medically necessary for this patient for a duration of greater than two midnights  See H&P and MD Progress Notes for additional information about the patient's course of treatment  ED Arrival Information     Expected   -    Arrival   6/28/2023 13:57    Acuity   Emergent            Means of arrival   Walk-In    Escorted by   [facility] Member    Service   Emergency Medicine    Admission type   Emergency            Arrival complaint   Detox , Psych Marilou           Chief Complaint   Patient presents with   • Detox Evaluation     Patient came in with CRS requesting detox from alcohol  States he drinks a handle of vodka a day  Last drink 1 hour pta       Initial Presentation: 32 y o  male who presented to medical detox  Inpatient admission for evaluation and treatment of alcohol withdrawal syndrome  Presented w/ need for detox from alcohol  Serum ETOH: 462  Reports a handle of vodka daily, last drink on 6/28 evening  Has no prior rehab treatment for withdrawal  Reports no hx of withdrawal seizures  On exam, acutely intoxicated, swelling to eyes, bruising to R anterior shoulder, abrasion to posterior R shoulder, slurred speech   Plan: SEWS monitoring w/ phenobarbital management, IV thiamine/folic acid supplement, IVF, telemetry, continuous pulse ox, trend labs, replete electrolytes as needed  Date: 06/29/23       Day 2: Pt reports no withdrawal symptoms  S/t significant ethanol level on admission, except withdrawal symptoms to begin this afternoon  On exam, anxious  SEWS 0  Plan: continue SEWS monitoring w/ phenobarbital management, IV thiamine/folic acid supplement, telemetry, continuous pulse ox, trend labs, replete electrolytes as needed  ED Triage Vitals   Temperature Pulse Respirations Blood Pressure SpO2   06/28/23 1427 06/28/23 1427 06/28/23 1427 06/28/23 1427 06/28/23 1427   98 5 °F (36 9 °C) (!) 121 22 136/89 96 %      Temp Source Heart Rate Source Patient Position - Orthostatic VS BP Location FiO2 (%)   06/28/23 1427 06/28/23 1427 06/28/23 1427 06/28/23 1427 --   Oral Monitor Lying Left arm       Pain Score       06/28/23 2000       No Pain          Wt Readings from Last 1 Encounters:   06/28/23 90 3 kg (199 lb)     Vital Signs:   Date/Time Temp Pulse Resp BP MAP (mmHg) SpO2 O2 Device   06/29/23 1050 97 7 °F (36 5 °C) 73 14 134/88 -- 95 % None (Room air)   06/29/23 0720 98 8 °F (37 1 °C) 85 16 120/71 -- 97 % None (Room air)   06/29/23 0500 98 1 °F (36 7 °C) -- 16 114/73 -- 97 % None (Room air)   06/28/23 2000 97 7 °F (36 5 °C) 100 16 118/63 -- 100 % None (Room air)   06/28/23 1957 -- -- -- -- -- 100 % None (Room air)   06/28/23 1848 98 4 °F (36 9 °C) 80 16 113/71 85 94 % None (Room air)   06/28/23 1615 -- 102 20 126/87 -- 96 % None (Room air)       Severity of Ethanol Withdrawal Scale (SEWS):    06/29/23 1200 06/29/23 0800 06/29/23 0500   ANXIETY: Do you feel that something bad is about to happen to you right now?  0 0 0  -RF   NAUSEA and DRY HEAVES or VOMITING? 0 0 0  -RF   SWEATING: (includes moist palms, sweating now)?  Score 0 or 2 0 0 0  -RF   TREMOR: with arms extended eyes closed?  0 0 0  -RF   AGITATION: Fidgety, restless, pacing? 0 0 0  -RF   DISORIENTATION: 0 0 0  -RF   HALLUCINATIONS: 0 0 0  -RF   VITAL SIGNS: ANY (Pulse >110, Diastolic BP >49, Temp >36 9) 0 0 0  -RF   SEWS Total Score 0 0 0  -RF         Pertinent Labs/Diagnostic Test Results:   Results from last 7 days   Lab Units 06/29/23  0533 06/28/23  1446   WBC Thousand/uL 5 62 7 49   HEMOGLOBIN g/dL 12 5 15 2   HEMATOCRIT % 38 2 45 9   PLATELETS Thousands/uL 196 236   NEUTROS ABS Thousands/µL  --  3 83         Results from last 7 days   Lab Units 06/29/23  0533 06/28/23  1446   SODIUM mmol/L 142 145   POTASSIUM mmol/L 3 9 3 7   CHLORIDE mmol/L 106 109*   CO2 mmol/L 26 24   ANION GAP mmol/L 10 12   BUN mg/dL 9 11   CREATININE mg/dL 0 64 0 80   EGFR ml/min/1 73sq m 134 122   CALCIUM mg/dL 8 1* 8 5   MAGNESIUM mg/dL 1 8* 2 1     Results from last 7 days   Lab Units 06/29/23  0533 06/28/23  1446   AST U/L 48* 64*   ALT U/L 41 53*   ALK PHOS U/L 38 69   TOTAL PROTEIN g/dL 5 1* 6 2*   ALBUMIN g/dL 3 3* 4 2   TOTAL BILIRUBIN mg/dL 0 53 0 29         Results from last 7 days   Lab Units 06/29/23  0533 06/28/23  1446   GLUCOSE RANDOM mg/dL 84 115       Results from last 7 days   Lab Units 06/28/23  1511   AMPH/METH  Negative   BARBITURATE UR  Negative   BENZODIAZEPINE UR  Negative   COCAINE UR  Negative   METHADONE URINE  Negative   OPIATE UR  Negative   PCP UR  Negative   THC UR  Negative     Results from last 7 days   Lab Units 06/28/23  1446   ETHANOL LVL mg/dL 462*   ACETAMINOPHEN LVL ug/mL <15*   SALICYLATE LVL mg/dL <5         ED Treatment:   Medication Administration from 06/28/2023 1357 to 06/28/2023 1903       Date/Time Order Dose Route Action     06/28/2023 1505 EDT sodium chloride 0 9 % bolus 1,000 mL 1,000 mL Intravenous New Bag     06/28/2023 1553 EDT thiamine (VITAMIN B1) 500 mg in sodium chloride 0 9 % 50 mL IVPB 500 mg Intravenous New Bag     57/44/1622 9797 EDT folic acid 1 mg in sodium chloride 0 9 % 50 mL IVPB 1 mg Intravenous New Bag     06/28/2023 1459 EDT LORazepam (ATIVAN) injection 0 5 mg 0 5 mg Intravenous Given     06/28/2023 1617 EDT LORazepam (ATIVAN) injection 1 mg 1 mg Intravenous Given        Present on Admission:  • Alcohol use disorder, severe, dependence ([facility] )  • [facility]  • Alcohol intoxication ([facility] )  • Elevated LFTs  • Depression      Admitting Diagnosis: Alcohol abuse [F10 10]  Persons encountering health services in other specified circumstances [Z76 89]  Age/Sex: 32 y o  male  Admission Orders:  Regular Diet  SCDs  Fall & Seizure Precautions  SEWS monitoring  Telemetry & Continuous Pulse Ox  Scheduled Medications:  enoxaparin, 40 mg, Subcutaneous, Daily  folic acid 1 mg, thiamine (VITAMIN B1) 100 mg in sodium chloride 0 9 % 100 mL IV piggyback, , Intravenous, Daily  multivitamin-minerals, 1 tablet, Oral, Daily    Continuous IV Infusions:  multi-electrolyte, 100 mL/hr, Intravenous, Continuous    PRN Meds:  ondansetron, 4 mg, Intravenous, Q6H PRN        IP CONSULT TO CASE MANAGEMENT    Network Utilization Review Department  ATTENTION: Please call with any questions or concerns to [phone] and carefully listen to the prompts so that you are directed to the right person  All voicemails are confidential   Alma Query all requests for admission clinical reviews, approved or denied determinations and any other requests to dedicated fax number below belonging to the campus where the patient is receiving treatment   List of dedicated fax numbers for the Facilities:  [address] [place] [address] DENIALS (Administrative/Medical Necessity) [phone]   [address] [place] [address] (Maternity/NICU/Pediatrics) [phone]   [address] [facility]  [phone]   [address] [place] [address] [address] [facility]  [address] [address] [phone]   [address] [phone]

## 2023-06-29 NOTE — DISCHARGE INSTR - OTHER ORDERS
HOW TO GET SUBSTANCE ABUSE HELP:  If you or someone you know has a drug or alcohol problem, there is help:  De[address] 44: [address]: [phone]  An assessment is the first step  In addition to those listed there are other programs available in the area but assessment is best to determine an appropriate level of care  If you [address], an assessment can be scheduled at one of these providers:        [facility] D[facility]A Intake Unit  620 [facility] [address] , 1st Floor, [place], [facility] [address]  [phone]  [address], 300 [facility],6Th Floor, [facility] 97, 5137 [facility] 5559 W [facility]  [address] , Þ[facility], 2[address]  [facility] 84  [address]  [facility]  [phone]   Psychiatric)  [address] [place] 57 [facility], [place], [facility] [address]  [address] Þ[facility]  425  [facility]  1320 [facility] , [facility], 98 [facility]  114 [facility]:   Obtain a housing voucher at the [address] [place] [address] at [email or web address]  Photo identification will be required  Come to the [address] [place] [address] O between 3:30 P  M  and 7:30 P M  (Josse Banegas is served at [employer] P M )  Para poder ser [employer] en esta MISSION tienes que hacer lo siguiente:  Tienes que obtener un boleto (voucher) de en la estacion de policia menezes 10 Y [place]kinson  Desiree identificacion con retrato es requerida    La entrada a la [place] es de 3:30 P M  Y 7:30 P M  (La comida sera servida a las 5:30 P M )      [employer] Locations & Contact Information:  BALTA Garcia 97 [address] [place] [address]  Municipalities:  [facility], [place], [facility], [facility], and [facility]ogen [facility]  [place] [address]  [facility]  Address: Betty Andrews 574, AnalyksBetty cornelius 1460  Phone: [phone]  Hours of Operation: Fridays 10:00AM-1:00PM  Sev[facility]  Address: 1200 Located within [facility], [place], [address]/Shelli Ramirez  Phone: [phone]  Hours of Operation: Thursdays 5:30PM-6:30PM    Sue Brito  Address: [address], [place], 98 [facility]  Phone: [phone]  Hours of Operation: Monday-Friday 9:30AM-12:00PM  [facility]  Address: 119 piper Simpson, [place], [address] [place] [address]  Phone: [phone]  Hours of Operation: By appointment -- Mondays, Tuesdays, Thursdays, & Fridays 8:30AM-4:00PM;  Wednesdays 8:30AM-12:00PM  VIA [facility]  Address: 4200 [facility], [address]  Phone: [phone]  Hours of Operation: 1st & 3rd Saturdays 10:00AM-12:00PM  [facility]  Address: [address], [place], [address] [place] [address]  Phone: [phone]  Hours of Operation: 2nd & 4th Thursdays 4:00PM-5:30PM (Only 4th Thursdays during the summer)  [facility]  Address: 1300 [facility], [place], [address] [place] [address]  Phone: [phone]  Hours of Operation: By appointment -- Wednesdays 6:00PM  [facility]  Address: 326 [facility], [address]  Phone: [phone]  Hours of Operation: Thursdays 11:00AM-2:00PM or By appointment  [facility]  Address: [address], [address]  Phone: [phone]  Hours of Operation: Saturdays 9:00AM-11:30AM  [facility]  Address: 2295 [facility], Keshav[address]  Phone: [phone]  Hours of Operation: Fridays, 3rd & 4th Saturdays 9:00AM-11:00AM  [address]  Address: 1703 [facility], Keshav[address]  Phone: [phone]  Hours of Operation: Tuesdays 3:00PM-5:00PM or By appointment  Ministering [facility]  Address: Jimmy, Favi[address]ostas[address]  Phone: [phone]  Hours of Operation: By appointment  "[employer]  Address: [address]  Romero Mckeon, [place], 98 [facility]  Phone: [phone]  Hours of Operation: 3rd Saturday, 8:00AM-11:00AM  RIPPLE  Address: [address] President Rigoberto Sosa, [place], Luther Monahan 54  Phone: [phone]  Hours of Operation: 3rd Sunday 4:00PM-5:30PM    [address] Sosa [facility])  Address: [address], [place], 98 [facility]  Phone: [phone]  Hours of Operation: 3rd Wednesday 9:00AM-4:00PM  [facility]  Address: [address], [place], 98 [facility]  Phone: [phone]  Hours of Operation: 1st & 3rd Saturdays 9:00AM-11:30AM  [address]  Address: 503 N [facility], [place], 98 [facility]  Phone: [phone]  Hours of Operation: By appointment  [address] Pantry  Address: 22 S [facility], [place], [address]  Phone: [phone]  Hours of Operation: 3rd Sunday 12:00PM-1:30PM  Ca [facility]  Address: 2329 [facility], [place], [address]  Phone: [phone]  Hours of Operation: 1st & 3rd Thursdays 6:30PM-7:30PM; By appointment -- Mondays  [facility]  Address: [address] Alec Richards, [place], [address]  Phone: [phone]  Hours of Operation: By appointment  [facility]  Address: [address], [place], [address]  Phone: [phone]  Hours of Operation: 1st & 3rd Wednesdays 4:00PM-5:30PM  [facility]  Address: 815 Critical access hospital, Kes[place], [address]  Phone: [phone]  Hours of Operation: 4th Wednesday 6:30PM-7:30PM  [address] Open Door Pantry  Address: 2701 N [facility], Keshav, [address]  Phone: [phone]  Hours of Operation: 2nd Tuesday 10:00AM-12:00PM; 4th Thursday 4:00PM-6:00PM  [address] ([facility])  Address: Mark Clark   45 , [address]  Phone: [phone]  Hours of Operation: By appointment -- Monday-Friday 9:00AM-5:00PM  [address]  Address: 4199 [facility] , ÞLeg[facility] HospitalksB[facility], 120 [facility]  Phone: [phone]  Hours of Operation: 1st & 3rd Tuesdays 9:00AM-10:30AM; Thursdays 6:00PM-8:00PM    Love [employer] thereNow  Address: [place], [place], 120 [facility]  Phone: [phone]  Hours of Operation: Every other Saturday 10:00AM-12:00PM  [facility] [place] [facility]  Address: 545 [facility], [facility], 120 [facility]  Phone: [phone]  Hours of Operation: 3rd Monday 6:00PM-7:30PM  [address] [place] [address]  Address: 24530 [facility], [address]  Phone: [phone]  Hours of Operation: 4th Sunday 9:00AM-11:00AM  [facility] 13, Adrián  Address: [address] [place] [address], [address]  Phone: [phone]  Hours of Operation: Saturdays 10:30AM-12:30PM  [facility]  Address: [facility], [address]  Phone: [phone]  Hours of Operation: By appointment -- Teresa Freeman 8:30AM-4:30PM  [facility] Pantry  Address: [address], [address]  Phone: [phone]  Hours of Operation: 1st & 3rd Wednesdays Kelly Curran De Aureliano 240  [address]  Address: [address]  Phone: [phone]  Hours of Operation: Wednesdays & Fridays 3:00PM-4:00PM  [place] [address] [facility] Pantry  Address: 3600 [facility] [address]  Phone: [phone]  Hours of Operation: Every other Saturday 10:30AM-12:30PM  24543 [facility] [employer]  Address: Alexa 812, [place], [address] [place], [place] [address]  Phone: [phone]  Hours of Operation: Tuesdays & Wednesdays 10:00AM-2:00PM; Closed last week of the month  [place] - [address] [facility] Connect[employer]  Address: MARTHA Park, [address] [place] [address]  Phone: [phone]  Hours of Operation: 1st Saturday 9:00AM-12:00PM; 3rd Thursday 4:00PM-7:00PM    [address] [place] [address] [employer]  Address: [facility], 367 [facility], 833 [facility]  Phone: [phone]  Hours of Operation: 3rd Monday & 3rd Wednesday 4:00PM-6:00PM; 3rd Saturday 10:00AM-12:00PM  [address]  Address: 3350 [facility] Elia DavidsonJefferson [facility], [address] [facility]  Phone: [phone]  Hours of Operation: 1st & 3rd Mondays 9:00AM-11:30AM  202 [facility]  Address: 3019 [facility] [address] [place] [address]  Phone: [phone]  Hours of Operation: 2nd Saturday 9:00AM-11:00AM  96214 [facility]  Address: [address], Roland, 23 [address]  Phone: [phone]  Hours of Operation: 1st, 2nd, & 3rd Thursdays 4:00PM-7:00PM; Last Saturday 9:30AM-12:00PM  [facility]  Address: [address] [place] [address] Kimmie [address] [place] [address]   Phone: [phone]  Hours of Operation: Tuesdays 6:00PM-7:00PM; Saturdays 4:00PM-5:00PM; Sundays 12:30PM-1:30PM  [facility] [employer]  Address: [address] [facility] [address]   Phone: [phone]  Hours of Operation: By appointment -- Mondays 6:00PM      103 [facility]  Address: 241 [facility], [address] [place] [address]  Phone: [phone]  Hours of Operation: Monday-Friday 1:30PM-2:30PM  [facility])  Address: [address], [address] [place] [address]  Phone: [phone]  Hours of Operation: Monday-Friday 9:00AM-5:00PM  [facility]  Address: [address] [place] [address], [address] [place] [address]  Phone: [phone]  Hours of Operation: Tuesday-Thursday 12:00PM-1:00PM    [address] [place] [address]  Address: [address] [place] [address], [address] [place] [address]  Phone: [phone]  Hours of Operation: Sundays 8:00AM-9:00AM; Some holidays  [address] [place] [address] Avenue:  [facility], ARTI, Georgina, Helio Wallace, Amaya, and Tj   Address: [address] [place] [address],12Th Floor, [facility]  Phone: [phone]  Hours of Operation: 2nd Tuesday 10:00AM-12:00PM  506 [facility],3Rd Fl of the [facility]  Address: Timur Joiner [address]  Phone: [phone]  Hours of Operation: Wednesdays 10:00AM-12:00PM  [facility]  Address: [address], Rudolph Hong  Phone: [phone]  Hours of Operation: 1st & 3rd Tuesdays 5:00PM-6:00PM  [facility]  Address: [address], [address]  Phone: [phone]  Hours of Operation: Tuesdays 6:00PM-7:00PM  1801 [facility]  Address: 5351 Timur Smiley [address]  Phone: [phone]  Hours of Operation: 1st & 2nd Saturdays 12:00PM- 4:00PM  [facility] Pantry  Address: [address]ler [address]  Phone: [phone]  Hours of Operation: Monday-Friday 10:30AM-11:30AM  [address] [place] [address]  Address: 1530 [facility] 90 MabankTimur [address]  Phone: [phone]  Hours of Operation: 3rd & 4th Saturdays 9:00AM-11:00AM; [facility] residents only    [facility]  Address: [address] 18[address] [facility] CenterTimur Valadouro 3  Phone: [phone]  Hours of Operation: 2nd Thursday 10:00AM-12:00PM  400 [facility] Pantry  Address: 900 [facility] HospitalTimur Valadouro 3  Phone: [phone]  Hours of Operation: Monday & Tuesday of the first full week of the month 9:00AM-11:00AM  [facility]  Address: 1 Eau Clairemaricruz ScottTimur Valadou[address] 3  Phone: [phone]  Hours of Operation: Mondays, Wednesdays, & Thursdays 9:30AM-11:30AM  [address]  Address: 5980 Timur Webster [address] 3  Phone: [phone]  Hours of Operation: 2nd & 4th Saturdays 10:00AM-12:00PM  Timur Keith  14  [facility]  Address: SHERIE Stein 261Timur, [address]   Phone: [phone]  Hours of Operation: By appointment -- Tuesdays & Wednesdays 10:00AM-4:00PM, Thursdays 10:00AM-  12:00PM, & Sundays 4:00PM-7:00PM  [employer]  Address: Vanessa Cevallos, [facility], [address]  Phone: [phone]  Hours of Operation: 3rd Saturday 10:00AM-12:00PM  ALIZE DENNISON [facility] VIVIAN Lim  Address: Luke Sánchez, [facility], [address]  Phone: [phone]  Hours of Operation: 3rd Saturday 9:00AM-11:30AM or by appointment  [address] [place] [address]  Address: [address] [place] , [facility], [address]  Phone: [phone]  Hours of Operation: 2nd & 4thTuesdays 10:00AM-12:00PM  [facility]  Address: Veronica  49, [facility], [address]  Phone: [phone]  Hours of Operation: 1st, 2nd, & 4th Wednesdays 11:00AM-1:00PM; 3rd Wednesday [address] [address] [facility]  Address: Myla De Luna 69, [facility], [address]  Phone: [phone]  Hours of Operation: Wednesdays 10:00AM-12:00PM; Last Wednesday 6:00PM-8:00PM  [facility]  Address: [facility], [address]  Phone: [phone]  Hours of Operation: Fridays 8:30AM-11:30AM & 1:30PM-3:30PM    [facility] Army  Address: RON GallagherTim[facility], [address]  Phone: [phone]  Hours of Operation: By appointment--Mondays-Fridays 9:00AM -4:30PM   [place] Dye [employer]  Address: 100 Michelle C[facility]allosTimpan[facility], [address]  Phone: [phone]  Hours of Operation: 1st & 3rd Tuesdays 6:00PM-7:30PM  [facility]  Address: 1013 [facility], [address]  Phone: [phone]  Hours of Operation: By appointment -- Mondays-Fridays 9:00AM-5:00PM  [facility]  Address: 230 [facility], [address] [place] [address]  Phone: [phone]  Hours of Operation: 3rd, 4th, & 5th Thursdays 5:00PM-7:00PM  Project of [address] [place] [address]  Address: [address]168 [facility], [address] [place] [address]  Phone: [phone]  Hours of Operation: Mondays 10:00AM-12:30PM; Thursdays 10:00AM-12:30PM & 1:00PM-3:30 PM  [facility] ArticleAlley, [facility]  Address: 721 [facility],  Alonsopiper Short[facility], [address] [place], [place] [address]one Isaban  Phone: [phone]  Hours of Operation: Tuesdays 5:00PM-6:00PM  1024 [facility]  Address: ARTI Sheth, 4420 [facility]  Phone: [phone]  Hours of Operation: Thursdays 6:00PM-7:30PM; Closed 5th Thursday  [address]  Address: 1100 Maurilio Stewartl, [address]  Phone: [phone]  Hours of Operation:  For last names beginning with A-M: 2nd Tuesday 8:00AM-10:00AM or 6:00PM-7:00PM;  For last names beginning with N-Z: 2nd Wednesday 8:00AM-10:00AM  [address] Fort Plainpiper Mcdonald  Address: Arely Hall, Georgina, 1541 [facility]  Phone: [phone]  Hours of Operation: Wednesdays 9:30AM-12:00PM; 1st, 2nd, & 3rd Thursdays 6:00PM-8:00PM; 2nd & 3rd Saturdays 9:30AM-12:00PM  [place] - [address]  [facility]  Address: 703 [facility], [address] [place] [address], [facility] Nw  [address]  Phone: [phone]  Hours of Operation: 3rd Saturday 9:00AM-11:00AM    PCCT [place]  Address: 201 [facility], [address] [place] [address], [facility] Nw  [address]  Phone: [phone]  Hours of Operation: Tuesdays 10:00AM-2:00PM  [place] [facility]  Address: De [facility], [address] [place] [address], [facility] Nw  [address]  Phone: [phone]  Hours of Operation: Tuesdays 10:00AM-12:00PM; Closed 5th Tuesday  [place] - [address]  PUMP  Address: 9[address] [facility], 2200 [facility]  Phone: [phone]  Hours of Operation: Mondays 11:00AM-12:30PM & 7:00PM-8:30PM  Janay Villeda - [address] Pantry  Address: [address], Janay Villeda, [address]  Phone: [phone]  Hours of Operation: Mondays 5:00PM-7:00PM  [address] /StChristy Days  Address: 424 [facility], [address] [place] [address]  Phone: [phone]  Hours of Operation: 2nd & 4th Saturdays 9:00AM-10:00AM  [address] [facility]  Address: [address] [place] [address], [facility]  Phone: [phone]  Hours of Operation: 2nd Saturday - Lunch (Call for times)  [facility] Army  Address: SHERIE [address], Timur, [address] Jenni [address]  Phone: [phone]  Ours of Operation: Sundays 1:00PM-2:00PM  820 [facility]  Address: [address] Radf[address] EverettValenteer, George[address]  Phone: [phone]  Hours of Operation: Saturdays 12:00PM-1:00PM  [facility]  Address: [address] [place] [facility] HospitalValenteer, 210 [facility]  Phone: [phone]  Hours of Operation: Monday-Friday 8:00AM-4:00PM  [facility]  Address: 700 [facility] Malina Everett Timur, George[address]  Phone: [phone]  Hours of Operation: Monday-Friday 12:00PM-1:00PM    [facility]  Address: 1100 [facility], [address] [place] [address] [place]  Phone: [phone]  Hours of Operation: Call for tobias TRONCOSOCarolina [facility]  Address: 0177 [facility], [address] [place] [address] [place]  Phone: [phone]  Hours of Operation: Monday-Friday 12:00PM-1:00PM

## 2023-06-29 NOTE — PROGRESS NOTES
06/29/23 0902   Referral Data   Referral Source Other (Comment)   Referral Name [facility] ED   Referral Reason Drug/Alcohol Atamaria 52   Readmission Root Cause   30 Day Readmission No   Patient Information   Mental Status Alert   Primary Caregiver Self   Support System Community   Legal Information   Legal Issues Denies   Activities of Daily Living Prior to Admission   Functional Status Independent   Assistive Device No device needed   Living Arrangement Homeless   Ambulation Independent   Access to Firearms   Access to Firearms No   Income [address] Sosa [employer] and [facility] [employer]   Dwale [employer] of Transport to Appts: [employer] - Bus         06/29/23 0901   Substance Abuse Addendum Details   History of Withdrawal Symptoms Other withdrawal symptoms (specify in comment)  (headaches, nausea, anxiety, tremors)   Medical Complications None acute   Sober Supports None   Present Treatment None   Substance Abuse Treatment Hx Past Tx, Inpatient; Past Tx, Outpatient; Past detox   ASAM Level & Criteria 3 5 inpatient   Stage of Change   Stage of Change Contemplation       Additional Substance Use Detail    Questions Responses   Problems Due to Past Use of Alcohol? Yes   Alcohol Use Frequency Daily   Alcohol Drink of Choice Vodka   1st Use of Alcohol 13   Last Use of Alcohol & Amount 1 handle of vodka, 6/28/2023   Longest Abstinence from Alcohol 30 days while in rehab       Worker completed assessment  Worker explained role of case management  Worker confirmed name, address and phone number  Pt presented to [facility] ED with CRS from [place] requesting alcohol withdrawal, was transferred to  for withdrawal management  Pt did sign MARCELINA for Treatment Trends to contact CRS  Pt reports he was in inpatient rehab at [facility] about 1 month ago and relapsed on discharge  Pt reports he has been drinking 1 handle of vodka daily    Pt reports first age of use 15, last used 6/28/2023  Pt reports struggling with alcohol use since the age of 15  Pt reports a hx of blackouts  Pt reports his parents struggled with alcohol use  Pt denies any street drugs  Pt reports multiple inpatient rehab programs in the past   Pt reports no clean time, except while in rehab  Pt at this time requesting inpatient rehab, signed MARCELINA for [facility]  Alcohol: 462  UDS: negative    Pt reports struggling with depression, denies any formal diagnosis  Pt denies any inpatient psychiatric hospitalizations  Pt denies any outpatient psychiatric provider  Pt denies any suicide attempts  Pt denies any abuse or trauma  Pt did not report any medical issues, reports he recently saw a PCP at [facility], did not sign MARCELINA at this time  Pt reports having health insurance of [employer], signed MARCELINA  Pt denies any legal issues  Pt is currently single, with one child, does not have custody of child  Pt is currently homeless since October 2022  Pt is currently unemployed  Pt reports highest level of education is HS diploma  Pt declined to sign MARCELINA for any family or friends at this time, reports having no supports  Relapse prevention plan was completed  Pt was not able to identify his warning signs or coping skills  Pt reports having no community supports  Clinical impression, pt was calm and cooperative throughout assessment but was beginning to experience withdrawal symptoms  Pt was able to provide current alcohol use  Pt appears to have limited insight into his alcohol use, has been to various inpatient rehabs but still is not able to acknowledge warning signs or coping skills  Pt at this time would benefit from long term inpatient rehab  Pt is requesting inpatient rehab on discharge  Pt would benefit from attending [place] [address] meetings on discharge to establish a more supportive recovery network  Pt is in the contemplation stage of change

## 2023-06-29 NOTE — PROGRESS NOTES
Progress Note - Medical Toxicology    Nena Primrose 32 y o  male MRN: [id number]  Unit/Bed#: 5T DETOX 506-01 Encounter: [id number]  MEDICAL DETOX UNIT, LEVEL 4  Department of Medical Toxicology  Reason for Admission/Principal Problem: alcohol withdrawal   Rounding Provider: Mary De Souza PA-C, Rodney Trevino DO         * Alcohol withdrawal syndrome with complication [facility])  Assessment & Plan  Patient with a history of chronic daily alcohol use  Last drink the evening of 6/28  Serum alcohol 462 in the ED  Received 1 5 mg lorazepam in the ED PTA  Continue SEWS protocol for medical management of alcohol withdrawal  Currently denies any acute withdrawal symptoms  Initial dose of phenobarbital held due to high alcohol level and lack of withdrawal symptoms     Continue monitoring under protocol and administer phenobarbital as indicated  Continuous pulse ox and telemetry monitoring    Alcohol use disorder, severe, dependence ([facility] )  Assessment & Plan  Patient with h/o chronic alcohol use  Currently consumes 1 handle of vodka daily  Last drink the evening of 6/28  Serum ethanol 462 (6/28/2023 1446)  Continue daily thiamine/folic acid supplementation  Manage withdrawal as above   Consult case management for assistance with disposition planning     Depression  Assessment & Plan  · Reports recent decrease in mood due to increased alcohol use  · Denies SI/HI  · No continual observation indicated at this time  · Has never been treated for depression in the past  · Continue to monitor    Elevated LFTs  Assessment & Plan  Recent Labs     06/29/23  0533   AST 48*   ALT 41   ALKPHOS 38   TBILI 0 53     · Mild  · In setting of chronic alcohol use  · Currently denies acute abdominal pain  · Continue to monitor   · Encourage alcohol cessation     Alcohol intoxication ([facility] )  Assessment & Plan  · Serum ethanol 462 (6/28/2023 1446)  · Appears clinically intoxicated currently   · Continue to monitor and manage withdrawal as above     Class 1 obesity due to excess calories without serious comorbidity with body mass index (BMI) of 31 0 to 31 9 in adult  Assessment & Plan  · Encourage healthy diet, exercise           VTE Pharmacologic Prophylaxis:   Pharmacologic: Enoxaparin (Lovenox)  Mechanical VTE Prophylaxis in Place: yes    Code Status: Level 1 - Full Code    Patient Centered Rounds: I have performed bedside rounds with nursing staff today  Discussions with Specialists or Other Care Team Provider: Case Management    Education and Discussions with Family / Patient: I personally answered all of the patient's questions to the best     Time Spent for Care: 30 minutes  More than 50% of total time spent on counseling and coordination of care as described above  Current Length of Stay: 1 day(s)    Current Patient Status: Inpatient     Certification Statement: The patient will continue to require additional inpatient hospital stay due to alcohol withdrawal  Discharge Plan: Anticipated Discharge within 48-72 hours         Subjective:   Patient seen and examined at bedside  Currently not endorsing any withdrawal symptoms  Patient had a significant ethanol level upon admission and suspect that he will begin to develop withdrawal symptoms this afternoon  He does endorse having previous withdrawal symptom when he stops drinking  Agreeable to continue to monitor on SEWS       Objective:     Clinical Opiate Withdrawal Scale  Pulse: 85    SEWS Total Score: 0 (6/29/2023  5:00 AM)        Last 24 Hours Medication List:   Current Facility-Administered Medications   Medication Dose Route Frequency Provider Last Rate   • enoxaparin  40 mg Subcutaneous Daily Lian Granados PA-C     • folic acid 1 mg, thiamine (VITAMIN B1) 100 mg in sodium chloride 0 9 % 100 mL IV piggyback   Intravenous Daily Lian Kimberlyncisco PA-C     • multi-electrolyte  100 mL/hr Intravenous Continuous Lian Defrancisco, PA-C 100 mL/hr (06/28/23 1947)   • multivitamin-minerals  1 tablet Oral Daily Lian Granados PA-C     • ondansetron  4 mg Intravenous Q6H PRN Lian Granados PA-C           Vitals:   Temp (24hrs), Av 3 °F (36 8 °C), Min:97 7 °F (36 5 °C), Max:98 8 °F (37 1 °C)    Temp:  [97 7 °F (36 5 °C)-98 8 °F (37 1 °C)] 98 8 °F (37 1 °C)  HR:  [] 85  Resp:  [16-22] 16  BP: (113-136)/(63-89) 120/71  SpO2:  [94 %-100 %] 97 %  Body mass index is 31 17 kg/m²  Input and Output Summary (last 24 hours): Intake/Output Summary (Last 24 hours) at 2023 1019  Last data filed at 2023 1826  Gross per 24 hour   Intake 1050 ml   Output --   Net 1050 ml       Physical Exam:   Physical Exam  Constitutional:       General: He is not in acute distress  Appearance: He is obese  He is not diaphoretic  Eyes:      General: No scleral icterus  Pupils: Pupils are equal, round, and reactive to light  Cardiovascular:      Rate and Rhythm: Normal rate and regular rhythm  Heart sounds: No murmur heard  Pulmonary:      Effort: No respiratory distress  Breath sounds: Normal breath sounds  Abdominal:      General: Bowel sounds are normal  There is no distension  Palpations: Abdomen is soft  Tenderness: There is no abdominal tenderness  Neurological:      Mental Status: He is alert and oriented to person, place, and time  Psychiatric:         Mood and Affect: Mood is anxious  Mood is not depressed           Additional Data:     Labs: keep all most recent labs as listed on admission templates   Results from last 7 days   Lab Units 23  0533 23  1446   WBC Thousand/uL 5 62 7 49   HEMOGLOBIN g/dL 12 5 15 2   HEMATOCRIT % 38 2 45 9   PLATELETS Thousands/uL 196 236   NEUTROS PCT %  --  51   LYMPHS PCT %  --  38   MONOS PCT %  --  9   EOS PCT %  --  1      Results from last 7 days   Lab Units 23  0533   SODIUM mmol/L 142   POTASSIUM mmol/L 3 9   CHLORIDE mmol/L 106   CO2 mmol/L 26   BUN mg/dL 9   CREATININE mg/dL 0 64   ANION GAP mmol/L 10   CALCIUM mg/dL 8 1*   ALBUMIN g/dL 3 3*   TOTAL BILIRUBIN mg/dL 0 53   ALK PHOS U/L 38   ALT U/L 41   AST U/L 48*   GLUCOSE RANDOM mg/dL 84                              * I Have Reviewed All Lab Data Listed Above  * Additional Pertinent Lab Tests Reviewed: [facility] Admission Reviewed      Imaging Studies: I have personally reviewed pertinent reports  Recent Cultures (last 7 days): Today, Patient Was Seen By: Jessica Mortensen PA-C    ** Please Note: Dictation voice to text software may have been used in the creation of this document   **

## 2023-06-30 ENCOUNTER — TRANSITIONAL CARE MANAGEMENT (OUTPATIENT)
Dept: FAMILY MEDICINE CLINIC | Facility: CLINIC | Age: 28
End: 2023-06-30

## 2023-06-30 VITALS
TEMPERATURE: 97.5 F | RESPIRATION RATE: 16 BRPM | OXYGEN SATURATION: 95 % | DIASTOLIC BLOOD PRESSURE: 78 MMHG | WEIGHT: 199 LBS | SYSTOLIC BLOOD PRESSURE: 130 MMHG | BODY MASS INDEX: 31.23 KG/M2 | HEIGHT: 67 IN | HEART RATE: 61 BPM

## 2023-06-30 PROBLEM — F10.939 ALCOHOL WITHDRAWAL SYNDROME WITH COMPLICATION (HCC): Status: RESOLVED | Noted: 2023-06-28 | Resolved: 2023-06-30

## 2023-06-30 PROBLEM — F10.929 ALCOHOL INTOXICATION (HCC): Status: RESOLVED | Noted: 2023-06-28 | Resolved: 2023-06-30

## 2023-06-30 LAB
ALBUMIN SERPL BCP-MCNC: 3.6 G/DL (ref 3.5–5)
ALP SERPL-CCNC: 41 U/L (ref 34–104)
ALT SERPL W P-5'-P-CCNC: 47 U/L (ref 7–52)
ANION GAP SERPL CALCULATED.3IONS-SCNC: 8 MMOL/L
AST SERPL W P-5'-P-CCNC: 44 U/L (ref 13–39)
BILIRUB SERPL-MCNC: 0.94 MG/DL (ref 0.2–1)
BUN SERPL-MCNC: 7 MG/DL (ref 5–25)
CALCIUM SERPL-MCNC: 8.6 MG/DL (ref 8.4–10.2)
CHLORIDE SERPL-SCNC: 104 MMOL/L (ref 96–108)
CO2 SERPL-SCNC: 27 MMOL/L (ref 21–32)
CREAT SERPL-MCNC: 0.67 MG/DL (ref 0.6–1.3)
GFR SERPL CREATININE-BSD FRML MDRD: 131 ML/MIN/1.73SQ M
GLUCOSE SERPL-MCNC: 111 MG/DL (ref 65–140)
MAGNESIUM SERPL-MCNC: 1.9 MG/DL (ref 1.9–2.7)
POTASSIUM SERPL-SCNC: 3.8 MMOL/L (ref 3.5–5.3)
PROT SERPL-MCNC: 5.9 G/DL (ref 6.4–8.4)
SODIUM SERPL-SCNC: 139 MMOL/L (ref 135–147)

## 2023-06-30 PROCEDURE — 80053 COMPREHEN METABOLIC PANEL: CPT

## 2023-06-30 PROCEDURE — 83735 ASSAY OF MAGNESIUM: CPT

## 2023-06-30 PROCEDURE — 99239 HOSP IP/OBS DSCHRG MGMT >30: CPT

## 2023-06-30 RX ORDER — LANOLIN ALCOHOL/MO/W.PET/CERES
100 CREAM (GRAM) TOPICAL DAILY
Status: DISCONTINUED | OUTPATIENT
Start: 2023-06-30 | End: 2023-06-30 | Stop reason: HOSPADM

## 2023-06-30 RX ORDER — FOLIC ACID 1 MG/1
1 TABLET ORAL DAILY
Status: DISCONTINUED | OUTPATIENT
Start: 2023-06-30 | End: 2023-06-30 | Stop reason: HOSPADM

## 2023-06-30 RX ADMIN — THIAMINE HCL TAB 100 MG 100 MG: 100 TAB at 08:47

## 2023-06-30 RX ADMIN — MULTIPLE VITAMINS W/ MINERALS TAB 1 TABLET: TAB ORAL at 08:47

## 2023-06-30 RX ADMIN — FOLIC ACID 1 MG: 1 TABLET ORAL at 08:47

## 2023-06-30 NOTE — PLAN OF CARE
Problem: PAIN - ADULT  Goal: Verbalizes/displays adequate comfort level or baseline comfort level  Description: Interventions:  - Encourage patient to monitor pain and request assistance  - Assess pain using appropriate pain scale  - Administer analgesics based on type and severity of pain and evaluate response  - Implement non-pharmacological measures as appropriate and evaluate response  - Consider cultural and social influences on pain and pain management  - Notify physician/advanced practitioner if interventions unsuccessful or patient reports new pain  Outcome: Progressing     Problem: INFECTION - ADULT  Goal: Absence or prevention of progression during hospitalization  Description: INTERVENTIONS:  - Assess and monitor for signs and symptoms of infection  - Monitor lab/diagnostic results  - Monitor all insertion sites, i e  indwelling lines, tubes, and drains  - Monitor endotracheal if appropriate and nasal secretions for changes in amount and color  - [place] appropriate cooling/warming therapies per order  - Administer medications as ordered  - Instruct and encourage patient and family to use good hand hygiene technique  - Identify and instruct in appropriate isolation precautions for identified infection/condition  Outcome: Progressing  Goal: Absence of fever/infection during neutropenic period  Description: INTERVENTIONS:  - Monitor WBC    Outcome: Progressing     Problem: DISCHARGE PLANNING  Goal: Discharge to home or other facility with appropriate resources  Description: INTERVENTIONS:  - Identify barriers to discharge w/patient and caregiver  - Arrange for needed discharge resources and transportation as appropriate  - Identify discharge learning needs (meds, wound care, etc )  - Arrange for interpretive services to assist at discharge as needed  - Refer to Case Management Department for coordinating discharge planning if the patient needs post-hospital services based on physician/advanced practitioner order or complex needs related to functional status, cognitive ability, or social support system  Outcome: Progressing     Problem: Knowledge Deficit  Goal: Patient/family/caregiver demonstrates understanding of disease process, treatment plan, medications, and discharge instructions  Description: Complete learning assessment and assess knowledge base    Interventions:  - Provide teaching at level of understanding  - Provide teaching via preferred learning methods  Outcome: Progressing

## 2023-06-30 NOTE — ASSESSMENT & PLAN NOTE
Patient with h/o chronic alcohol use  Currently consumes 1 handle of vodka daily  Last drink the evening of 6/28  Serum ethanol 462 (6/28/2023 1446)  Continue daily thiamine/folic acid supplementation  Manage withdrawal as above   Consult case management for assistance with disposition planning - patient will meet CRS worker upon discharge  Not interested in inpatient drug and alcohol rehab at this time

## 2023-06-30 NOTE — CASE MANAGEMENT
Case Management Discharge Planning Note    Patient name Josafat Hiltno  Location 5T DETOX 506/5T DETOX 50* MRN [id number]  : 1995 Date 2023       Current Admission Date: 2023  Current Admission Diagnosis:Alcohol withdrawal syndrome with complication [facility])   Patient Active Problem List    Diagnosis Date Noted   • Alcohol use disorder, severe, dependence ([facility] ) 2023   • Alcohol withdrawal syndrome with complication ([facility] )    • Alcohol intoxication ([facility] ) 2023   • Elevated LFTs 2023   • Depression 2023   • Class 1 obesity due to excess calories without serious comorbidity with body mass index (BMI) of 31 0 to 31 9 in adult 2023   • 's permit PE (physical examination) 2023      LOS (days): 2  Geometric Mean LOS (GMLOS) (days):   Days to GMLOS:     OBJECTIVE:  Risk of Unplanned Readmission Score: 8 76         Current admission status: Inpatient   Preferred Pharmacy:   [facility],[address], [address]  [address]  [place] [address] [id number]  Phone: [phone] Fax: [phone]    Primary Care Provider: Juanito House MD    Primary Insurance: Garcia Fakpiper  Secondary Insurance:     DISCHARGE DETAILS:Cm met with pt and pt stated he did not want inpatient [facility] rehab but wanted to be discharged ASAP  Pt maintained he would work with his CRS for further treatment  Pt stated he wanted no further aftercare planned  Cm contacted pt's Shani Graham at [phone]  Danish Chaudhari requested to meet with pt on the unit  Pt stated he did not want to meet on the unit but rather outside  Danish Chaudhari stated she would meet pt in front of hospital  Pt stated he is homeless and will walk to the shelter  Pt discharging home with no follow up at pt request  Resources placed on AVS for [facility] tx and homelessness      Discharge planning discussed with[de-identified] patient  Freedom of Choice: Yes                   Contacts  Patient Contacts: Danish Chaudhari CRS  Relationship to Patient[de-identified] Treatment Provider  Reason/Outcome: Continuity of Care, Discharge Planning              Other Referral/Resources/Interventions Provided:  Referrals Provided[de-identified] Support Group, Therapist, IOP    Would you like to participate in our [address] service program?  : No - Declined                                              Family notified[de-identified] No supportive MARCELINA's signed

## 2023-06-30 NOTE — ASSESSMENT & PLAN NOTE
Recent Labs     06/30/23  0552   AST 44*   ALT 47   ALKPHOS 41   TBILI 0 94     · Mild  · Encourage alcohol cessation  · In setting of chronic alcohol use  · Currently denies acute abdominal pain  · Continue to monitor   · Encourage alcohol cessation

## 2023-06-30 NOTE — ASSESSMENT & PLAN NOTE
Patient with a history of chronic daily alcohol use  Last drink the evening of 6/28  Serum alcohol 462 in the ED  Received 1 5 mg lorazepam in the ED PTA  Has been monitored on SEWS protocol and has not exhibited any signs or symptoms of alcohol withdrawal  Vital Signs have been stable throughout the admission  Patient did not require any phenobarbital during hospital admission    Appears stable from withdrawal perspective  Encouraged Alcohol Cessation upon discharge

## 2023-06-30 NOTE — CASE MANAGEMENT
"Cm informed by medical staff pt ready for transition to inpatient [facility] rehab  Cm met with pt  Pt stated he was uncertain if he wanted inpatient rehab at this time and stated he wanted to leave  CM discussed with pt the difficulties with his plan to leave and be homeless  Pt stated he wanted to \"get back to work\"  Ry discussed with pt the step process of rehab, [facility] house, recovery house and how this could lead to pt becoming more stable  Pt maintained he wanted to leave and go to work  Cm asked pt to have breakfast and discuss this further with cm  Pt agreed    "

## 2023-07-03 DIAGNOSIS — Z71.89 COORDINATION OF COMPLEX CARE: Primary | ICD-10-CM

## 2023-07-03 NOTE — UTILIZATION REVIEW
NOTIFICATION OF ADMISSION DISCHARGE   This is a Notification of Discharge from [facility]. Please be advised that this patient has been discharge from our facility. Below you will find the admission and discharge date and time including the patient’s disposition. UTILIZATION REVIEW CONTACT:  Sandi Patino MA  Utilization   Network Utilization Review Department  Phone: [phone] x carefully listen to the prompts. All voicemails are confidential.  Email: [email or web address]     ADMISSION INFORMATION  PRESENTATION DATE: 6/28/2023  2:16 PM  OBERVATION ADMISSION DATE:   INPATIENT ADMISSION DATE: 6/28/23  4:32 PM   DISCHARGE DATE: 6/30/2023 11:39 AM   DISPOSITION:Home/Self Care    IMPORTANT INFORMATION:  Send all requests for admission clinical reviews, approved or denied determinations and any other requests to dedicated fax number below belonging to the campus where the patient is receiving treatment.  List of dedicated fax numbers:  [place] DENIALS (Administrative/Medical Necessity) [phone] [address] [facility] (Maternity/NICU/Pediatrics) [phone]   [facility] [phone]   [facility] [phone] 16[address] [facility] [phone]   [address] [place] [address] [phone]   [facility] [phone]   [address] [place] [address] [address] [place] [address] [phone]   [address] [place] [address] [phone] [address] [facility] [phone] 27[address] [facility] [address] 32[facility] [phone]

## 2023-07-05 ENCOUNTER — PATIENT OUTREACH (OUTPATIENT)
Dept: FAMILY MEDICINE CLINIC | Facility: CLINIC | Age: 28
End: 2023-07-05

## 2023-07-05 DIAGNOSIS — Z78.9 NEED FOR FOLLOW-UP BY SOCIAL WORKER: Primary | ICD-10-CM

## 2023-07-05 NOTE — PROGRESS NOTES
HRR.    Chart reviewed. Patient does not meet RN CM criteria. Referral placed to [facility]. Case is being closed.

## 2023-07-07 ENCOUNTER — PATIENT OUTREACH (OUTPATIENT)
Dept: FAMILY MEDICINE CLINIC | Facility: CLINIC | Age: 28
End: 2023-07-07

## 2023-07-07 NOTE — PROGRESS NOTES
MARSHAL COTTO did receive a new referral regarding Pt with hx depression and alcohol abuse. After chart review MARSHAL COTTO did place a call to Pt but he did not  the phone, MARSHAL COTTO was unable to leave a message. MARSHAL COTTO will attempt to reach out Pt again. MARSHAL COTTO is remain available for further assistance as needed.

## 2023-07-08 ENCOUNTER — HOSPITAL ENCOUNTER (INPATIENT)
Facility: HOSPITAL | Age: 28
LOS: 2 days | Discharge: OTHER INSTITUTION | End: 2023-07-10
Attending: EMERGENCY MEDICINE | Admitting: FAMILY MEDICINE
Payer: COMMERCIAL

## 2023-07-08 DIAGNOSIS — F10.930 ALCOHOL WITHDRAWAL SYNDROME WITHOUT COMPLICATION (HCC): Primary | ICD-10-CM

## 2023-07-08 PROBLEM — E87.6 HYPOKALEMIA: Status: ACTIVE | Noted: 2023-07-08

## 2023-07-08 PROBLEM — E87.8 ELECTROLYTE ABNORMALITY: Status: ACTIVE | Noted: 2023-07-08

## 2023-07-08 PROBLEM — E87.21 ACUTE LACTIC ACIDOSIS: Status: ACTIVE | Noted: 2023-07-08

## 2023-07-08 PROBLEM — F10.931 ALCOHOL WITHDRAWAL DELIRIUM, ACUTE, HYPERACTIVE (HCC): Status: ACTIVE | Noted: 2023-07-08

## 2023-07-08 PROBLEM — E83.42 HYPOMAGNESEMIA: Status: ACTIVE | Noted: 2023-07-08

## 2023-07-08 LAB
ALBUMIN SERPL BCP-MCNC: 3.9 G/DL (ref 3.5–5)
ALP SERPL-CCNC: 45 U/L (ref 34–104)
ALT SERPL W P-5'-P-CCNC: 69 U/L (ref 7–52)
AMPHETAMINES SERPL QL SCN: NEGATIVE
ANION GAP SERPL CALCULATED.3IONS-SCNC: 14 MMOL/L
AST SERPL W P-5'-P-CCNC: 69 U/L (ref 13–39)
ATRIAL RATE: 95 BPM
BARBITURATES UR QL: NEGATIVE
BASOPHILS # BLD AUTO: 0.07 THOUSANDS/ÂΜL (ref 0–0.1)
BASOPHILS NFR BLD AUTO: 1 % (ref 0–1)
BENZODIAZ UR QL: NEGATIVE
BILIRUB SERPL-MCNC: 0.79 MG/DL (ref 0.2–1)
BILIRUB UR QL STRIP: NEGATIVE
BUN SERPL-MCNC: 9 MG/DL (ref 5–25)
CALCIUM SERPL-MCNC: 8.7 MG/DL (ref 8.4–10.2)
CHLORIDE SERPL-SCNC: 101 MMOL/L (ref 96–108)
CLARITY UR: CLEAR
CO2 SERPL-SCNC: 24 MMOL/L (ref 21–32)
COCAINE UR QL: NEGATIVE
COLOR UR: YELLOW
CREAT SERPL-MCNC: 0.71 MG/DL (ref 0.6–1.3)
EOSINOPHIL # BLD AUTO: 0 THOUSAND/ÂΜL (ref 0–0.61)
EOSINOPHIL NFR BLD AUTO: 0 % (ref 0–6)
ERYTHROCYTE [DISTWIDTH] IN BLOOD BY AUTOMATED COUNT: 13.2 % (ref 11.6–15.1)
ETHANOL SERPL-MCNC: <10 MG/DL
GFR SERPL CREATININE-BSD FRML MDRD: 128 ML/MIN/1.73SQ M
GLUCOSE SERPL-MCNC: 95 MG/DL (ref 65–140)
GLUCOSE UR STRIP-MCNC: NEGATIVE MG/DL
HCT VFR BLD AUTO: 38.5 % (ref 36.5–49.3)
HGB BLD-MCNC: 13 G/DL (ref 12–17)
HGB UR QL STRIP.AUTO: NEGATIVE
IMM GRANULOCYTES # BLD AUTO: 0.02 THOUSAND/UL (ref 0–0.2)
IMM GRANULOCYTES NFR BLD AUTO: 0 % (ref 0–2)
KETONES UR STRIP-MCNC: ABNORMAL MG/DL
LACTATE SERPL-SCNC: 0.9 MMOL/L (ref 0.5–2)
LACTATE SERPL-SCNC: 2.9 MMOL/L (ref 0.5–2)
LEUKOCYTE ESTERASE UR QL STRIP: NEGATIVE
LIPASE SERPL-CCNC: 37 U/L (ref 11–82)
LYMPHOCYTES # BLD AUTO: 0.87 THOUSANDS/ÂΜL (ref 0.6–4.47)
LYMPHOCYTES NFR BLD AUTO: 11 % (ref 14–44)
MAGNESIUM SERPL-MCNC: 1.3 MG/DL (ref 1.9–2.7)
MCH RBC QN AUTO: 32.5 PG (ref 26.8–34.3)
MCHC RBC AUTO-ENTMCNC: 33.8 G/DL (ref 31.4–37.4)
MCV RBC AUTO: 96 FL (ref 82–98)
METHADONE UR QL: NEGATIVE
MONOCYTES # BLD AUTO: 0.48 THOUSAND/ÂΜL (ref 0.17–1.22)
MONOCYTES NFR BLD AUTO: 6 % (ref 4–12)
NEUTROPHILS # BLD AUTO: 6.41 THOUSANDS/ÂΜL (ref 1.85–7.62)
NEUTS SEG NFR BLD AUTO: 82 % (ref 43–75)
NITRITE UR QL STRIP: NEGATIVE
NRBC BLD AUTO-RTO: 0 /100 WBCS
OPIATES UR QL SCN: NEGATIVE
OXYCODONE+OXYMORPHONE UR QL SCN: NEGATIVE
P AXIS: 50 DEGREES
PCP UR QL: NEGATIVE
PH UR STRIP.AUTO: 6.5 [PH]
PHOSPHATE SERPL-MCNC: 1.7 MG/DL (ref 2.7–4.5)
PLATELET # BLD AUTO: 223 THOUSANDS/UL (ref 149–390)
PMV BLD AUTO: 9.6 FL (ref 8.9–12.7)
POTASSIUM SERPL-SCNC: 3.4 MMOL/L (ref 3.5–5.3)
PR INTERVAL: 130 MS
PROT SERPL-MCNC: 6.2 G/DL (ref 6.4–8.4)
PROT UR STRIP-MCNC: NEGATIVE MG/DL
QRS AXIS: 28 DEGREES
QRSD INTERVAL: 90 MS
QT INTERVAL: 358 MS
QTC INTERVAL: 449 MS
RBC # BLD AUTO: 4 MILLION/UL (ref 3.88–5.62)
SODIUM SERPL-SCNC: 139 MMOL/L (ref 135–147)
SP GR UR STRIP.AUTO: 1.01 (ref 1–1.04)
T WAVE AXIS: 13 DEGREES
THC UR QL: POSITIVE
UROBILINOGEN UA: NEGATIVE MG/DL
VENTRICULAR RATE: 95 BPM
WBC # BLD AUTO: 7.85 THOUSAND/UL (ref 4.31–10.16)

## 2023-07-08 PROCEDURE — 80053 COMPREHEN METABOLIC PANEL: CPT | Performed by: EMERGENCY MEDICINE

## 2023-07-08 PROCEDURE — 82077 ASSAY SPEC XCP UR&BREATH IA: CPT | Performed by: EMERGENCY MEDICINE

## 2023-07-08 PROCEDURE — 96361 HYDRATE IV INFUSION ADD-ON: CPT

## 2023-07-08 PROCEDURE — 96374 THER/PROPH/DIAG INJ IV PUSH: CPT

## 2023-07-08 PROCEDURE — 80307 DRUG TEST PRSMV CHEM ANLYZR: CPT

## 2023-07-08 PROCEDURE — 83735 ASSAY OF MAGNESIUM: CPT | Performed by: EMERGENCY MEDICINE

## 2023-07-08 PROCEDURE — 93010 ELECTROCARDIOGRAM REPORT: CPT

## 2023-07-08 PROCEDURE — 84100 ASSAY OF PHOSPHORUS: CPT

## 2023-07-08 PROCEDURE — 85025 COMPLETE CBC W/AUTO DIFF WBC: CPT | Performed by: EMERGENCY MEDICINE

## 2023-07-08 PROCEDURE — 99285 EMERGENCY DEPT VISIT HI MDM: CPT | Performed by: EMERGENCY MEDICINE

## 2023-07-08 PROCEDURE — 99284 EMERGENCY DEPT VISIT MOD MDM: CPT

## 2023-07-08 PROCEDURE — 83605 ASSAY OF LACTIC ACID: CPT

## 2023-07-08 PROCEDURE — 93005 ELECTROCARDIOGRAM TRACING: CPT

## 2023-07-08 PROCEDURE — 83605 ASSAY OF LACTIC ACID: CPT | Performed by: EMERGENCY MEDICINE

## 2023-07-08 PROCEDURE — 36415 COLL VENOUS BLD VENIPUNCTURE: CPT | Performed by: EMERGENCY MEDICINE

## 2023-07-08 PROCEDURE — 83690 ASSAY OF LIPASE: CPT | Performed by: EMERGENCY MEDICINE

## 2023-07-08 RX ORDER — SODIUM CHLORIDE 9 MG/ML
190 INJECTION, SOLUTION INTRAVENOUS CONTINUOUS
Status: DISCONTINUED | OUTPATIENT
Start: 2023-07-08 | End: 2023-07-08

## 2023-07-08 RX ORDER — ACETAMINOPHEN 325 MG/1
650 TABLET ORAL ONCE
Status: COMPLETED | OUTPATIENT
Start: 2023-07-08 | End: 2023-07-08

## 2023-07-08 RX ORDER — MAGNESIUM SULFATE HEPTAHYDRATE 40 MG/ML
2 INJECTION, SOLUTION INTRAVENOUS ONCE
Status: COMPLETED | OUTPATIENT
Start: 2023-07-08 | End: 2023-07-08

## 2023-07-08 RX ORDER — ONDANSETRON 2 MG/ML
1 INJECTION INTRAMUSCULAR; INTRAVENOUS ONCE
Status: COMPLETED | OUTPATIENT
Start: 2023-07-08 | End: 2023-07-08

## 2023-07-08 RX ORDER — SODIUM CHLORIDE 9 MG/ML
125 INJECTION, SOLUTION INTRAVENOUS CONTINUOUS
Status: DISCONTINUED | OUTPATIENT
Start: 2023-07-08 | End: 2023-07-09

## 2023-07-08 RX ORDER — LANOLIN ALCOHOL/MO/W.PET/CERES
100 CREAM (GRAM) TOPICAL DAILY
Status: DISCONTINUED | OUTPATIENT
Start: 2023-07-09 | End: 2023-07-08

## 2023-07-08 RX ORDER — LORAZEPAM 2 MG/ML
1 INJECTION INTRAMUSCULAR ONCE
Status: COMPLETED | OUTPATIENT
Start: 2023-07-08 | End: 2023-07-08

## 2023-07-08 RX ORDER — POTASSIUM CHLORIDE 20 MEQ/1
40 TABLET, EXTENDED RELEASE ORAL ONCE
Status: COMPLETED | OUTPATIENT
Start: 2023-07-08 | End: 2023-07-08

## 2023-07-08 RX ORDER — LORAZEPAM 2 MG/ML
2 INJECTION INTRAMUSCULAR ONCE
Status: COMPLETED | OUTPATIENT
Start: 2023-07-08 | End: 2023-07-08

## 2023-07-08 RX ORDER — LANOLIN ALCOHOL/MO/W.PET/CERES
500 CREAM (GRAM) TOPICAL DAILY
Status: DISCONTINUED | OUTPATIENT
Start: 2023-07-09 | End: 2023-07-10 | Stop reason: HOSPADM

## 2023-07-08 RX ORDER — FOLIC ACID 1 MG/1
1 TABLET ORAL DAILY
Status: DISCONTINUED | OUTPATIENT
Start: 2023-07-09 | End: 2023-07-10 | Stop reason: HOSPADM

## 2023-07-08 RX ADMIN — MAGNESIUM SULFATE 2 G: 2 INJECTION INTRAVENOUS at 18:03

## 2023-07-08 RX ADMIN — LORAZEPAM 2 MG: 2 INJECTION INTRAMUSCULAR; INTRAVENOUS at 16:56

## 2023-07-08 RX ADMIN — ACETAMINOPHEN 650 MG: 325 TABLET ORAL at 16:56

## 2023-07-08 RX ADMIN — POTASSIUM CHLORIDE 40 MEQ: 1500 TABLET, EXTENDED RELEASE ORAL at 22:34

## 2023-07-08 RX ADMIN — SODIUM CHLORIDE 1000 ML: 0.9 INJECTION, SOLUTION INTRAVENOUS at 16:50

## 2023-07-08 RX ADMIN — SODIUM CHLORIDE 125 ML/HR: 0.9 INJECTION, SOLUTION INTRAVENOUS at 22:35

## 2023-07-08 NOTE — PLAN OF CARE
Problem: PAIN - ADULT  Goal: Verbalizes/displays adequate comfort level or baseline comfort level  Description: Interventions:  - Encourage patient to monitor pain and request assistance  - Assess pain using appropriate pain scale  - Administer analgesics based on type and severity of pain and evaluate response  - Implement non-pharmacological measures as appropriate and evaluate response  - Consider cultural and social influences on pain and pain management  - Notify physician/advanced practitioner if interventions unsuccessful or patient reports new pain  Outcome: Progressing     Problem: INFECTION - ADULT  Goal: Absence or prevention of progression during hospitalization  Description: INTERVENTIONS:  - Assess and monitor for signs and symptoms of infection  - Monitor lab/diagnostic results  - Monitor all insertion sites, i.e. indwelling lines, tubes, and drains  - Monitor endotracheal if appropriate and nasal secretions for changes in amount and color  - [place] appropriate cooling/warming therapies per order  - Administer medications as ordered  - Instruct and encourage patient and family to use good hand hygiene technique  - Identify and instruct in appropriate isolation precautions for identified infection/condition  Outcome: Progressing  Goal: Absence of fever/infection during neutropenic period  Description: INTERVENTIONS:  - Monitor WBC    Outcome: Progressing     Problem: SAFETY ADULT  Goal: Patient will remain free of falls  Description: INTERVENTIONS:  - Educate patient/family on patient safety including physical limitations  - Instruct patient to call for assistance with activity   - Consult OT/PT to assist with strengthening/mobility   - Keep Call bell within reach  - Keep bed low and locked with side rails adjusted as appropriate  - Keep care items and personal belongings within reach  - Initiate and maintain comfort rounds  - Make Fall Risk Sign visible to staff  - Offer Toileting every 2 Hours, in advance of need  - Initiate/Maintain bed alarm  - Obtain necessary fall risk management equipment: bed alarm  - Apply yellow socks and bracelet for high fall risk patients  - Consider moving patient to room near nurses station  Outcome: Progressing  Goal: Maintain or return to baseline ADL function  Description: INTERVENTIONS:  -  Assess patient's ability to carry out ADLs; assess patient's baseline for ADL function and identify physical deficits which impact ability to perform ADLs (bathing, care of mouth/teeth, toileting, grooming, dressing, etc.)  - Assess/evaluate cause of self-care deficits   - Assess range of motion  - Assess patient's mobility; develop plan if impaired  - Assess patient's need for assistive devices and provide as appropriate  - Encourage maximum independence but intervene and supervise when necessary  - Involve family in performance of ADLs  - Assess for home care needs following discharge   - Consider OT consult to assist with ADL evaluation and planning for discharge  - Provide patient education as appropriate  Outcome: Progressing  Goal: Maintains/Returns to pre admission functional level  Description: INTERVENTIONS:  - Perform BMAT or MOVE assessment daily.   - Set and communicate daily mobility goal to care team and patient/family/caregiver. - Collaborate with rehabilitation services on mobility goals if consulted  - Perform Range of Motion 2 times a day. - Reposition patient every 2 hours.   - Dangle patient 2 times a day  - Stand patient 2 times a day  - Ambulate patient 2 times a day  - Out of bed to chair 2 times a day   - Out of bed for meals 2 times a day  - Out of bed for toileting  - Record patient progress and toleration of activity level   Outcome: Progressing     Problem: DISCHARGE PLANNING  Goal: Discharge to home or other facility with appropriate resources  Description: INTERVENTIONS:  - Identify barriers to discharge w/patient and caregiver  - Arrange for needed discharge resources and transportation as appropriate  - Identify discharge learning needs (meds, wound care, etc.)  - Arrange for interpretive services to assist at discharge as needed  - Refer to Case Management Department for coordinating discharge planning if the patient needs post-hospital services based on physician/advanced practitioner order or complex needs related to functional status, cognitive ability, or social support system  Outcome: Progressing     Problem: Knowledge Deficit  Goal: Patient/family/caregiver demonstrates understanding of disease process, treatment plan, medications, and discharge instructions  Description: Complete learning assessment and assess knowledge base.   Interventions:  - Provide teaching at level of understanding  - Provide teaching via preferred learning methods  Outcome: Progressing

## 2023-07-08 NOTE — ASSESSMENT & PLAN NOTE
Patient with a hx of chronic etoh abuse who was recently admitted to the toxicology unit from 6/28/23-6/30/23 with serum alcohol 462. Averages about 6-8 beer cans daily along with liquor  Last drink was yesterday 7/7/23  Toxicology evaluated, input appreciated: Patient currently low risk for developing significant withdrawal. Recommendation to continue and management under [facility] protocol.   If patient is interested he can start naltexone 50 mg qd w/ coordinated f/u for outpatient MAT management   Patient interested in being discharged to long term house    -[facility] protocol  -  consult in place for assistance in placement in [facility]  -regular house diet  - Seizure and aspiration precautions

## 2023-07-08 NOTE — ED PROVIDER NOTES
History  Chief Complaint   Patient presents with   • Alcohol Intoxication     Pt reports he is withdrawing from alcohol, typically drinks 5 or 6 24 oz beers daily but has not drank since yesterday     80-year-old male presenting the emergency department with tremulousness, anxiety, nausea. Patient notes that last alcohol intake yesterday, normally drinks every day. No chest pain. No shortness of breath. No diarrhea. No cough congestion rhinorrhea. Patient having visual hallucination of bugs crawling on him. None       History reviewed. No pertinent past medical history. History reviewed. No pertinent surgical history. Family History   Problem Relation Age of Onset   • No Known Problems Mother    • No Known Problems Father    • No Known Problems Sister    • No Known Problems Brother    • No Known Problems Son      I have reviewed and agree with the history as documented. E-Cigarette/Vaping   • E-Cigarette Use Never User      E-Cigarette/Vaping Substances   • Nicotine No    • THC No    • CBD No    • Flavoring No    • Other No    • Unknown No      Social History     Tobacco Use   • Smoking status: Never     Passive exposure: Never   • Smokeless tobacco: Never   Vaping Use   • Vaping Use: Never used   Substance Use Topics   • Alcohol use: Yes     Alcohol/week: 12.0 standard drinks of alcohol     Types: 12 Cans of beer per week     Comment: 6  25 oz cans of Naddy Daddy beer a day   • Drug use: Never       Review of Systems   Constitutional: Negative for chills and fever. HENT: Negative for ear pain and sore throat. Eyes: Negative for pain and visual disturbance. Respiratory: Negative for cough and shortness of breath. Cardiovascular: Negative for chest pain and palpitations. Gastrointestinal: Positive for nausea. Negative for abdominal pain and vomiting. Genitourinary: Negative for dysuria and hematuria. Musculoskeletal: Negative for arthralgias and back pain.    Skin: Negative for color change and rash. Neurological: Positive for tremors. Negative for seizures and syncope. Psychiatric/Behavioral: The patient is nervous/anxious. All other systems reviewed and are negative. Physical Exam  Physical Exam  Vitals and nursing note reviewed. Constitutional:       General: He is not in acute distress. Appearance: He is well-developed. HENT:      Head: Normocephalic and atraumatic. Nose: Nose normal.      Mouth/Throat:      Mouth: Mucous membranes are moist.   Eyes:      Conjunctiva/sclera: Conjunctivae normal.   Cardiovascular:      Rate and Rhythm: Normal rate and regular rhythm. Heart sounds: No murmur heard. Pulmonary:      Effort: Pulmonary effort is normal. No respiratory distress. Breath sounds: Normal breath sounds. Abdominal:      Palpations: Abdomen is soft. Tenderness: There is no abdominal tenderness. Musculoskeletal:         General: No swelling. Cervical back: Neck supple. Skin:     General: Skin is warm and dry. Capillary Refill: Capillary refill takes less than 2 seconds. Neurological:      Mental Status: He is alert and oriented to person, place, and time. Comments: Visible resting tremors in bilateral upper extremities.    Psychiatric:         Mood and Affect: Mood normal.         Vital Signs  ED Triage Vitals   Temperature Pulse Respirations Blood Pressure SpO2   07/08/23 1607 07/08/23 1607 07/08/23 1607 07/08/23 1607 07/08/23 1607   (!) 100.8 °F (38.2 °C) (!) 124 20 147/81 100 %      Temp Source Heart Rate Source Patient Position - Orthostatic VS BP Location FiO2 (%)   07/08/23 1607 07/08/23 1607 07/08/23 1607 07/08/23 1607 --   Tympanic Monitor Sitting Right arm       Pain Score       07/08/23 1820       No Pain           Vitals:    07/08/23 1607 07/08/23 1645 07/08/23 1654 07/08/23 1820   BP: 147/81 124/78 124/78 138/89   Pulse: (!) 124 82 82 76   Patient Position - Orthostatic VS: Sitting Lying  Sitting Visual Acuity      ED Medications  Medications   magnesium sulfate 2 g/50 mL IVPB (premix) 2 g (2 g Intravenous New Bag 7/8/23 1803)   sodium chloride 0.9 % bolus 1,000 mL (1,000 mL Intravenous New Bag 7/8/23 1650)   LORazepam (ATIVAN) injection 2 mg (2 mg Intravenous Given 7/8/23 1656)   ondansetron (FOR EMS ONLY) (ZOFRAN) 4 mg/2 mL injection 4 mg (0 mg Does not apply Given to EMS 7/8/23 1613)   LORazepam (FOR EMS ONLY) (ATIVAN) 2 mg/mL injection 2 mg (0 mg Does not apply Given to EMS 7/8/23 1614)   acetaminophen (TYLENOL) tablet 650 mg (650 mg Oral Given 7/8/23 1656)       Diagnostic Studies  Results Reviewed     Procedure Component Value Units Date/Time    Lactic acid, plasma (w/reflex if result > 2.0) [[id number]]  (Abnormal) Collected: 07/08/23 1649    Lab Status: Final result Specimen: Blood from Arm, Left Updated: 07/08/23 1745     LACTIC ACID 2.9 mmol/L     Narrative:      Result may be elevated if tourniquet was used during collection.     Lactic acid 2 Hours [[id number]]     Lab Status: No result Specimen: Blood     Comprehensive metabolic panel [[id number]]  (Abnormal) Collected: 07/08/23 1649    Lab Status: Final result Specimen: Blood from Arm, Left Updated: 07/08/23 1727     Sodium 139 mmol/L      Potassium 3.4 mmol/L      Chloride 101 mmol/L      CO2 24 mmol/L      ANION GAP 14 mmol/L      BUN 9 mg/dL      Creatinine 0.71 mg/dL      Glucose 95 mg/dL      Calcium 8.7 mg/dL      AST 69 U/L      ALT 69 U/L      Alkaline Phosphatase 45 U/L      Total Protein 6.2 g/dL      Albumin 3.9 g/dL      Total Bilirubin 0.79 mg/dL      eGFR 128 ml/min/1.73sq m     Narrative:      [place] guidelines for Chronic Kidney Disease (CKD):   •  Stage 1 with normal or high GFR (GFR > 90 mL/min/1.73 square meters)  •  Stage 2 Mild CKD (GFR = 60-89 mL/min/1.73 square meters)  •  Stage 3A Moderate CKD (GFR = 45-59 mL/min/1.73 square meters)  •  Stage 3B Moderate CKD (GFR = 30-44 mL/min/1.73 square meters)  •  Stage 4 Severe CKD (GFR = 15-29 mL/min/1.73 square meters)  •  Stage 5 End Stage CKD (GFR <15 mL/min/1.73 square meters)  Note: GFR calculation is accurate only with a steady state creatinine    Lipase [[id number]]  (Normal) Collected: 07/08/23 1649    Lab Status: Final result Specimen: Blood from Arm, Left Updated: 07/08/23 1727     Lipase 37 u/L     Magnesium [[id number]]  (Abnormal) Collected: 07/08/23 1649    Lab Status: Final result Specimen: Blood from Arm, Left Updated: 07/08/23 1727     Magnesium 1.3 mg/dL     Ethanol [[id number]]  (Normal) Collected: 07/08/23 1649    Lab Status: Final result Specimen: Blood from Arm, Left Updated: 07/08/23 1722     Ethanol Lvl <10 mg/dL     CBC and differential [[id number]]  (Abnormal) Collected: 07/08/23 1649    Lab Status: Final result Specimen: Blood from Arm, Left Updated: 07/08/23 1703     WBC 7.85 Thousand/uL      RBC 4.00 Million/uL      Hemoglobin 13.0 g/dL      Hematocrit 38.5 %      MCV 96 fL      MCH 32.5 pg      MCHC 33.8 g/dL      RDW 13.2 %      MPV 9.6 fL      Platelets 927 Thousands/uL      nRBC 0 /100 WBCs      Neutrophils Relative 82 %      Immat GRANS % 0 %      Lymphocytes Relative 11 %      Monocytes Relative 6 %      Eosinophils Relative 0 %      Basophils Relative 1 %      Neutrophils Absolute 6.41 Thousands/µL      Immature Grans Absolute 0.02 Thousand/uL      Lymphocytes Absolute 0.87 Thousands/µL      Monocytes Absolute 0.48 Thousand/µL      Eosinophils Absolute 0.00 Thousand/µL      Basophils Absolute 0.07 Thousands/µL     UA (URINE) with reflex to Scope [[id number]]     Lab Status: No result Specimen: Urine                  No orders to display              Procedures  Procedures         ED Course                               SBIRT 22yo+    Flowsheet Row Most Recent Value   Initial Alcohol Screen: US AUDIT-C     1. How often do you have a drink containing alcohol? 6 Filed at: 07/08/2023 0963   2.  How many drinks containing alcohol do you have on a typical day you are drinking? 6 Filed at: 07/08/2023 1618   3a. Male UNDER 65: How often do you have five or more drinks on one occasion? 6 Filed at: 07/08/2023 1618   Audit-C Score 18 Filed at: 07/08/2023 1618   Full Alcohol Screen: US AUDIT    4. How often during the last year have you found that you were not able to stop drinking once you had started? 1 Filed at: 07/08/2023 1755   5. How often during past year have you failed to do what was normally expected of you because of drinking? 2 Filed at: 07/08/2023 1755   6. How often in past year have you needed a first drink in the morning to get yourself going after a heavy drinking session? 4 Filed at: 07/08/2023 1755   7. How often in past year have you had feeling of guilt or remorse after drinking? 4 Filed at: 07/08/2023 1755   8. How often in past year have you been unable to remember what happened night before because you had been drinking? 3 Filed at: 07/08/2023 1755   9. Have you or someone else been injured as a result of your drinking? 0 Filed at: 07/08/2023 1755   10. Has a relative, friend, doctor or other health worker been concerned about your drinking and suggested you cut down? 4 Filed at: 07/08/2023 1755   BROCK: How many times in the past year have you. .. Used an illegal drug or used a prescription medication for non-medical reasons? Never Filed at: 07/08/2023 1755                    Medical Decision Making  55-year-old male with tremors, visual hallucinations. Differential diagnosis includes alcohol withdrawal, drug intoxication, psychiatric disease. Likely alcohol withdrawal as patient daily drinker but not had alcohol in the last 24 hours. Patient feels better after IV Ativan. Will admit to medicine for further management for alcohol withdrawal.    Amount and/or Complexity of Data Reviewed  Labs: ordered. Risk  OTC drugs. Prescription drug management. Decision regarding hospitalization.           Disposition  Final diagnoses:   Alcohol withdrawal syndrome without complication ([address])     Time reflects when diagnosis was documented in both MDM as applicable and the Disposition within this note     Time User Action Codes Description Comment    7/8/2023  5:46 PM Quinn Krishnan [F10.930] Alcohol withdrawal syndrome without complication [facility])       ED Disposition     ED Disposition   Admit    Condition   Stable    Date/Time   Sat Jul 8, 2023  5:47 PM    Comment   Case was discussed with Dr. Farrukh Cruz and the patient's admission status was agreed to be Admission Status: inpatient status to the service of Dr. Karen Gloria . Follow-up Information    None         There are no discharge medications for this patient. No discharge procedures on file.     PDMP Review     None          ED Provider  Electronically Signed by           Dahlia Armstrong MD  07/08/23 5060
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
## 2023-07-09 PROBLEM — E87.8 ELECTROLYTE ABNORMALITY: Status: RESOLVED | Noted: 2023-07-08 | Resolved: 2023-07-09

## 2023-07-09 PROBLEM — E87.21 ACUTE LACTIC ACIDOSIS: Status: RESOLVED | Noted: 2023-07-08 | Resolved: 2023-07-09

## 2023-07-09 LAB
ALBUMIN SERPL BCP-MCNC: 3.3 G/DL (ref 3.5–5)
ALP SERPL-CCNC: 36 U/L (ref 34–104)
ALT SERPL W P-5'-P-CCNC: 52 U/L (ref 7–52)
ANION GAP SERPL CALCULATED.3IONS-SCNC: 7 MMOL/L
AST SERPL W P-5'-P-CCNC: 43 U/L (ref 13–39)
BASOPHILS # BLD AUTO: 0.04 THOUSANDS/ÂΜL (ref 0–0.1)
BASOPHILS NFR BLD AUTO: 1 % (ref 0–1)
BILIRUB SERPL-MCNC: 0.96 MG/DL (ref 0.2–1)
BUN SERPL-MCNC: 9 MG/DL (ref 5–25)
CALCIUM ALBUM COR SERPL-MCNC: 8.7 MG/DL (ref 8.3–10.1)
CALCIUM SERPL-MCNC: 8.1 MG/DL (ref 8.4–10.2)
CHLORIDE SERPL-SCNC: 105 MMOL/L (ref 96–108)
CO2 SERPL-SCNC: 27 MMOL/L (ref 21–32)
CREAT SERPL-MCNC: 0.66 MG/DL (ref 0.6–1.3)
EOSINOPHIL # BLD AUTO: 0.09 THOUSAND/ÂΜL (ref 0–0.61)
EOSINOPHIL NFR BLD AUTO: 2 % (ref 0–6)
ERYTHROCYTE [DISTWIDTH] IN BLOOD BY AUTOMATED COUNT: 13.2 % (ref 11.6–15.1)
GFR SERPL CREATININE-BSD FRML MDRD: 132 ML/MIN/1.73SQ M
GLUCOSE SERPL-MCNC: 86 MG/DL (ref 65–140)
HCT VFR BLD AUTO: 37.4 % (ref 36.5–49.3)
HGB BLD-MCNC: 12.8 G/DL (ref 12–17)
IMM GRANULOCYTES # BLD AUTO: 0.01 THOUSAND/UL (ref 0–0.2)
IMM GRANULOCYTES NFR BLD AUTO: 0 % (ref 0–2)
LYMPHOCYTES # BLD AUTO: 1.55 THOUSANDS/ÂΜL (ref 0.6–4.47)
LYMPHOCYTES NFR BLD AUTO: 32 % (ref 14–44)
MAGNESIUM SERPL-MCNC: 2.1 MG/DL (ref 1.9–2.7)
MCH RBC QN AUTO: 33.4 PG (ref 26.8–34.3)
MCHC RBC AUTO-ENTMCNC: 34.2 G/DL (ref 31.4–37.4)
MCV RBC AUTO: 98 FL (ref 82–98)
MONOCYTES # BLD AUTO: 0.64 THOUSAND/ÂΜL (ref 0.17–1.22)
MONOCYTES NFR BLD AUTO: 13 % (ref 4–12)
NEUTROPHILS # BLD AUTO: 2.46 THOUSANDS/ÂΜL (ref 1.85–7.62)
NEUTS SEG NFR BLD AUTO: 52 % (ref 43–75)
NRBC BLD AUTO-RTO: 0 /100 WBCS
PHOSPHATE SERPL-MCNC: 3.6 MG/DL (ref 2.7–4.5)
PLATELET # BLD AUTO: 192 THOUSANDS/UL (ref 149–390)
PMV BLD AUTO: 9.9 FL (ref 8.9–12.7)
POTASSIUM SERPL-SCNC: 3.7 MMOL/L (ref 3.5–5.3)
PROT SERPL-MCNC: 5.2 G/DL (ref 6.4–8.4)
RBC # BLD AUTO: 3.83 MILLION/UL (ref 3.88–5.62)
SODIUM SERPL-SCNC: 139 MMOL/L (ref 135–147)
WBC # BLD AUTO: 4.79 THOUSAND/UL (ref 4.31–10.16)

## 2023-07-09 PROCEDURE — 83735 ASSAY OF MAGNESIUM: CPT

## 2023-07-09 PROCEDURE — 80053 COMPREHEN METABOLIC PANEL: CPT

## 2023-07-09 PROCEDURE — 99254 IP/OBS CNSLTJ NEW/EST MOD 60: CPT | Performed by: NURSE PRACTITIONER

## 2023-07-09 PROCEDURE — 85025 COMPLETE CBC W/AUTO DIFF WBC: CPT

## 2023-07-09 PROCEDURE — NC001 PR NO CHARGE: Performed by: FAMILY MEDICINE

## 2023-07-09 PROCEDURE — 99222 1ST HOSP IP/OBS MODERATE 55: CPT | Performed by: FAMILY MEDICINE

## 2023-07-09 PROCEDURE — 84100 ASSAY OF PHOSPHORUS: CPT

## 2023-07-09 RX ORDER — LORAZEPAM 1 MG/1
2 TABLET ORAL EVERY 8 HOURS PRN
Status: DISCONTINUED | OUTPATIENT
Start: 2023-07-09 | End: 2023-07-10 | Stop reason: HOSPADM

## 2023-07-09 RX ADMIN — THIAMINE HCL TAB 100 MG 500 MG: 100 TAB at 08:33

## 2023-07-09 RX ADMIN — SODIUM CHLORIDE 125 ML/HR: 0.9 INJECTION, SOLUTION INTRAVENOUS at 06:08

## 2023-07-09 RX ADMIN — FOLIC ACID 1 MG: 1 TABLET ORAL at 08:33

## 2023-07-09 RX ADMIN — LORAZEPAM 2 MG: 1 TABLET ORAL at 09:33

## 2023-07-09 NOTE — PROGRESS NOTES
Progress Note    Dayan Real 32 y.o. male MRN: [id number]  Unit/Bed#: 7T [facility] 712-01 Encounter: [id number]  Admitting Physician: Binh Springer MD  PCP: Neftali Spring MD  Date of Admission:  7/8/2023  4:07 PM    Assessment and Plan    * Alcohol withdrawal syndrome without complication [facility])  Assessment & Plan  Patient with a hx of chronic etoh abuse who was recently admitted to the toxicology unit from 6/28/23-6/30/23 with serum alcohol 462. Averages about 6-8 beer cans daily along with liquor  Last drink was yesterday 7/7/23  Toxicology evaluated, input appreciated: Patient currently low risk for developing significant withdrawal. Recommendation to continue and management under [facility] protocol. If patient is interested he can start naltexone 50 mg qd w/ coordinated f/u for outpatient MAT management   Patient interested in being discharged to senior living house    -[facility] protocol  -  consult in place for assistance in placement in [facility]  -regular house diet  - Seizure and aspiration precautions    Elevated LFTs  Assessment & Plan  Secondary to chronic alcohol use    • Mild  • Encourage alcohol cessation      Alcohol use disorder, severe, dependence ([address])  Assessment & Plan  See A/P for alcohol withdrawal syndrome    Electrolyte abnormality-resolved as of 7/9/2023  Assessment & Plan  Lab Results   Component Value Date    K 3.7 07/09/2023     Magnesium   Date Value Ref Range Status   07/09/2023 2.1 1.9 - 2.7 mg/dL Final     Resolved     Acute lactic acidosis-resolved as of 7/9/2023  Assessment & Plan  Today 0.9 resolved   Received 1L NS bolus in the ED    - mIVF         VTE Pharmacologic Prophylaxis: VTE Score: 1 Low Risk (Score 0-2) - Encourage Ambulation. Patient Centered Rounds: I have performed bedside rounds with nursing staff today.     Discussions with Specialists or Other Care Team Provider: Toxicology    Education and Discussions with Family / Patient: No  Patient Information Sharing: No    Time Spent for Care: 20 minutes. More than 50% of total time spent on counseling and coordination of care as described above. Current Length of Stay: 1 day(s)    Current Patient Status: Inpatient   Certification Statement: The patient will continue to require additional inpatient hospital stay due to case management evaluation for DC placement     Discharge Plan: Less than 2 days    Code Status: Level 1 - Full Code      Subjective:   Isaura Vanegsa is seen on his second day of hospital stay with attending physician and senior resident. Patient reports no tremors, rapid heart beats, abdominal pain, n/v/c/d. Patient is interested in seeking assistance for being discharged to a senior living house or rehab. Discussion was provided that case management will help him. He denies any SI/HI. Objective:     Vitals:   Temp (24hrs), Av.4 °F (36.9 °C), Min:97.2 °F (36.2 °C), Max:100.8 °F (38.2 °C)    Temp:  [97.2 °F (36.2 °C)-100.8 °F (38.2 °C)] 98.1 °F (36.7 °C)  HR:  [] 65  Resp:  [11-20] 18  BP: (112-147)/(75-89) 118/78  SpO2:  [95 %-100 %] 98 %  Body mass index is 30.18 kg/m². Input and Output Summary (last 24 hours): Intake/Output Summary (Last 24 hours) at 2023 1259  Last data filed at 2023 1221  Gross per 24 hour   Intake 1223.75 ml   Output 400 ml   Net 823.75 ml       Physical Exam:     Physical Exam  Vitals reviewed. Constitutional:       Appearance: Normal appearance. HENT:      Head: Normocephalic. Nose: Nose normal.   Eyes:      Conjunctiva/sclera: Conjunctivae normal.   Cardiovascular:      Rate and Rhythm: Normal rate and regular rhythm. Pulses: Normal pulses. Heart sounds: No murmur heard. No gallop. Pulmonary:      Effort: Pulmonary effort is normal.      Breath sounds: Normal breath sounds. Abdominal:      General: Abdomen is flat. Bowel sounds are normal.   Musculoskeletal:         General: Normal range of motion.    Skin:     General: Skin is warm. Capillary Refill: Capillary refill takes less than 2 seconds. Neurological:      General: No focal deficit present. Mental Status: He is alert. Additional Data:     Labs:    Results from last 7 days   Lab Units 07/09/23  0531   WBC Thousand/uL 4.79   HEMOGLOBIN g/dL 12.8   HEMATOCRIT % 37.4   PLATELETS Thousands/uL 192   NEUTROS PCT % 52   LYMPHS PCT % 32   MONOS PCT % 13*   EOS PCT % 2     Results from last 7 days   Lab Units 07/09/23  0531   POTASSIUM mmol/L 3.7   CHLORIDE mmol/L 105   CO2 mmol/L 27   BUN mg/dL 9   CREATININE mg/dL 0.66   CALCIUM mg/dL 8.1*   ALK PHOS U/L 36   ALT U/L 52   AST U/L 43*                     * I Have Reviewed All Lab Data Listed Above. * Additional Pertinent Lab Tests Reviewed:  All [facility] Admission Reviewed      Recent Cultures (last 7 days):           Last 24 Hours Medication List:   Current Facility-Administered Medications   Medication Dose Route Frequency Provider Last Rate   • folic acid  1 mg Oral Daily Nilesh Paulson MD     • LORazepam  2 mg Oral Q8H PRN Tea Womack MD     • thiamine  500 mg Oral Daily MD Cody Deleon MD  07/09/23

## 2023-07-09 NOTE — ASSESSMENT & PLAN NOTE
Lab Results   Component Value Date    K 3.7 07/09/2023     Magnesium   Date Value Ref Range Status   07/09/2023 2.1 1.9 - 2.7 mg/dL Final     Resolved

## 2023-07-09 NOTE — QUICK NOTE
Patient was seen this evening at bedside. He reported feeling well and had no complaints. Patient is aware of plans to possibly go to a custodial house after discharge. Patient seems motivated.      /76   Pulse 61   Temp (!) 97 °F (36.1 °C) (Temporal)   Resp 18   Ht 5' 7" (1.702 m)   Wt 87.4 kg (192 lb 10.9 oz)   SpO2 96%   BMI 30.18 kg/m²

## 2023-07-09 NOTE — ASSESSMENT & PLAN NOTE
Lab Results   Component Value Date    K 3.4 (L) 07/08/2023       PLAN:  - 40meq K-dur once  - AM CMP

## 2023-07-09 NOTE — CONSULTS
Consultation - Medical Toxicology  Andrew Blackman 32 y.o. male MRN: [id number]  Unit/Bed#: 7T [facility] 36-26 Encounter: [id number]      Reason for Consult / Principal Problem: Alcohol withdrawal / alcohol use disorder   Consults  07/09/23      ASSESSMENT:  Alcohol Withdrawal, Mild - Serum ethanol <10mg/dL on 7/8. Currently being managed on CIWA and well managed, not exhibiting significant withdrawal. Recent hospitalization from 6/28-6/30 for withdrawal management. He endorses beginning to drink when discharged from hospital.   Alcohol Use Disorder - Longstanding history of alcohol use, drinking 1 handle of vodka and several beers daily. Interested in admission to [facility] house upon discharge. Case management to be consulted. Acute lactic acidosis- Lactate on admission 2.9, resolved on 2hr lactic acid. Receiving IVFs. Management per primary team.   Hypokalemia - Likely secondary to inadequate PO intake. Received supplementation. Hypomagnesemia - Likely secondary to inadequate PO intake. Receiving supplementation. Homelessness - Contributing to continued alcohol use. Case management consulted. Interested in transition to [facility] house upon discharge. RECOMMENDATIONS:  - Given patients recent admission from 6/28-6/30, he is low risk for developing significant withdrawal. Patient is 48 hours since last drink and has received two doses of 2mg lorazepam since admission. He is appropriate to continue monitoring and management under CIWA protocol.   - Patient is considering beginning oral naltrexone. However, he has not decided. Medication regimen and follow up was discussed. If patient is interested in oral naltrexone can begin 50mg daily with coordinated follow up for outpatient MAT management. - Significant psychosocial factors such as homelessness contributing to ongoing alcohol use. Patient is interested in [facility] house once discharged.         For further questions, please call [facility]  Service or Patient Access Center to reach the medical  on call. Please see additional teaching note below (if available)    Ethanol Withdrawal Discussion  Sudden discontinuation of chronic ethanol use can precipitate a severe withdrawal syndrome that include symptoms tremor, anxiety, headache, palpitations, insomnia, nausea and vomiting, tachycardia and hypertension. Seizures may also occur, generally within 6-12 hours after cessation of ethanol use. As a result of chronic ethanol abuse, SHAWANDA activity down regulatese while NMDA activity upregulates. Sympathetic nervous system overactivity results and may progress to delirium tremens. Delirium tremens is a life-threatening condition that is characterized by tachycardia, diaphoresis, hyperthermia, delirium and hallucinations. It usually occurs about 48-72 hours after discontinuation of heavy ethanol use. If not treated appropriately, significant morbidity and mortality can be associated. Early treatment is most effective and options include benzodiazepines or barbiturates. Hx and PE limited by: not limited     HPI: Chase Hartmann is a 32y.o. year old male with past medical hx of alcohol use disorder, and homelessness who presented to emergency department with concern of anxiety, and nausea. On average he consumes 1 handle of vodka and several beers daily. Previous detox admission from 6/28-6/30 for withdrawal management. After discharge, he endorses continuing to drink daily. He has not been on naltrexone in the past and is considering on starting however he does not want to be on medication. Is open to considering due to his inability to sustain sobriety. He currently denies any further N/V, cp, palpitations, or SOB. Identifies homelessness as trigger to his continued drinking. He is currently interested in transitioning to a [facility] house upon discharge. Review of Systems   Constitutional: Negative for chills, diaphoresis and fever.    Eyes: Negative for visual disturbance. Respiratory: Negative for cough, chest tightness, shortness of breath and wheezing. Cardiovascular: Negative for chest pain and palpitations. Gastrointestinal: Negative for abdominal distention, abdominal pain, nausea and vomiting. Musculoskeletal: Negative for myalgias. Neurological: Negative for tremors, weakness, light-headedness and headaches. Psychiatric/Behavioral: Negative for hallucinations and suicidal ideas. The patient is nervous/anxious. Historical Information   History reviewed. No pertinent past medical history. History reviewed. No pertinent surgical history.   Social History   Social History     Substance and Sexual Activity   Alcohol Use Yes   • Alcohol/week: 12.0 standard drinks of alcohol   • Types: 12 Cans of beer per week    Comment: 6  25 oz cans of Naddy Daddy beer a day     Social History     Substance and Sexual Activity   Drug Use Never     Social History     Tobacco Use   Smoking Status Never   • Passive exposure: Never   Smokeless Tobacco Never     Family History   Problem Relation Age of Onset   • No Known Problems Mother    • No Known Problems Father    • No Known Problems Sister    • No Known Problems Brother    • No Known Problems Son         Prior to Admission medications    Not on File       Current Facility-Administered Medications   Medication Dose Route Frequency   • folic acid (FOLVITE) tablet 1 mg  1 mg Oral Daily   • LORazepam (ATIVAN) tablet 2 mg  2 mg Oral Q8H PRN   • thiamine tablet 500 mg  500 mg Oral Daily       No Known Allergies    Objective       Intake/Output Summary (Last 24 hours) at 7/9/2023 1050  Last data filed at 7/9/2023 0608  Gross per 24 hour   Intake 943.75 ml   Output 400 ml   Net 543.75 ml       Invasive Devices:   Peripheral IV 07/08/23 Left Antecubital (Active)   Site Assessment WDL 07/08/23 1939   Dressing Type Transparent 07/08/23 1939   Line Status Flushed 07/08/23 1939   Dressing Status Clean;Dry; Intact 07/08/23 1939       Vitals   Vitals:    07/08/23 2324 07/09/23 0500 07/09/23 0729 07/09/23 0900   BP: 122/75  112/76 118/78   TempSrc: Temporal  Temporal Temporal   Pulse: 74 57 65 65   Resp: 16 18 18   Patient Position - Orthostatic VS: Lying  Lying Sitting   Temp: (!) 97.2 °F (36.2 °C)  98.1 °F (36.7 °C) 98.1 °F (36.7 °C)       Physical Exam  Vitals reviewed. Constitutional:       Appearance: He is not toxic-appearing. HENT:      Head: Normocephalic and atraumatic. Nose: Nose normal.      Mouth/Throat:      Mouth: Mucous membranes are moist.      Pharynx: Oropharynx is clear. Eyes:      Conjunctiva/sclera: Conjunctivae normal.      Pupils: Pupils are equal, round, and reactive to light. Cardiovascular:      Rate and Rhythm: Normal rate and regular rhythm. Pulses: Normal pulses. Heart sounds: Normal heart sounds. Pulmonary:      Effort: Pulmonary effort is normal. No respiratory distress. Breath sounds: Normal breath sounds. Abdominal:      General: Abdomen is flat. Bowel sounds are normal. There is no distension. Palpations: Abdomen is soft. Musculoskeletal:         General: No swelling. Normal range of motion. Cervical back: Normal range of motion. Right lower leg: No edema. Left lower leg: No edema. Skin:     General: Skin is warm and dry. Neurological:      General: No focal deficit present. Mental Status: He is alert and oriented to person, place, and time. Motor: No tremor. Coordination: Coordination is intact. Finger-Nose-Finger Test and Heel to [facility] [place] Test normal.   Psychiatric:         Mood and Affect: Mood normal.         Behavior: Behavior is cooperative. EKG, Pathology, and Other Studies: I have personally reviewed pertinent reports. Lab Results: I have personally reviewed pertinent reports.       Labs:  Results from last 7 days   Lab Units 07/09/23  0531 07/08/23  1649   WBC Thousand/uL 4.79 7.85 HEMOGLOBIN g/dL 12.8 13.0   HEMATOCRIT % 37.4 38.5   PLATELETS Thousands/uL 192 223   NEUTROS PCT % 52 82*   LYMPHS PCT % 32 11*   MONOS PCT % 13* 6   EOS PCT % 2 0      Results from last 7 days   Lab Units 07/09/23  0531 07/08/23  1649   SODIUM mmol/L 139 139   POTASSIUM mmol/L 3.7 3.4*   CHLORIDE mmol/L 105 101   CO2 mmol/L 27 24   BUN mg/dL 9 9   CREATININE mg/dL 0.66 0.71   CALCIUM mg/dL 8.1* 8.7   ALK PHOS U/L 36 45   ALT U/L 52 69*   AST U/L 43* 69*   MAGNESIUM mg/dL 2.1 1.3*   PHOSPHORUS mg/dL 3.6 1.7*          Results from last 7 days   Lab Units 07/08/23  1939 07/08/23  1649   LACTIC ACID mmol/L 0.9 2.9*     No results found for: "TROPONINI"      Results from last 7 days   Lab Units 07/08/23  1649   ETHANOL LVL mg/dL <10     Invalid input(s): "EXTPREGUR"    Imaging Studies: I have personally reviewed pertinent reports. Counseling / Coordination of Care  Total floor / unit time spent today 36 minutes. Greater than 50% of total time was spent with the patient and / or family counseling and / or coordination of care.

## 2023-07-10 VITALS
SYSTOLIC BLOOD PRESSURE: 117 MMHG | RESPIRATION RATE: 18 BRPM | OXYGEN SATURATION: 99 % | WEIGHT: 192.68 LBS | TEMPERATURE: 96.6 F | HEART RATE: 81 BPM | BODY MASS INDEX: 30.24 KG/M2 | HEIGHT: 67 IN | DIASTOLIC BLOOD PRESSURE: 84 MMHG

## 2023-07-10 PROBLEM — F10.930 ALCOHOL WITHDRAWAL SYNDROME WITHOUT COMPLICATION (HCC): Status: RESOLVED | Noted: 2023-07-08 | Resolved: 2023-07-10

## 2023-07-10 PROBLEM — R79.89 ELEVATED LFTS: Status: RESOLVED | Noted: 2023-06-28 | Resolved: 2023-07-10

## 2023-07-10 PROCEDURE — 99238 HOSP IP/OBS DSCHRG MGMT 30/<: CPT | Performed by: FAMILY MEDICINE

## 2023-07-10 RX ORDER — NALTREXONE HYDROCHLORIDE 50 MG/1
50 TABLET, FILM COATED ORAL DAILY
Status: DISCONTINUED | OUTPATIENT
Start: 2023-07-10 | End: 2023-07-10 | Stop reason: HOSPADM

## 2023-07-10 RX ORDER — ARIPIPRAZOLE 5 MG/1
5 TABLET ORAL DAILY
Status: DISCONTINUED | OUTPATIENT
Start: 2023-07-10 | End: 2023-07-10 | Stop reason: HOSPADM

## 2023-07-10 RX ORDER — FOLIC ACID 1 MG/1
1 TABLET ORAL DAILY
Qty: 60 TABLET | Refills: 3 | Status: SHIPPED | OUTPATIENT
Start: 2023-07-11

## 2023-07-10 RX ORDER — METHION/INOS/CHOL BT/B COM/LIV 110MG-86MG
250 CAPSULE ORAL DAILY
Qty: 30 TABLET | Refills: 0 | Status: SHIPPED | OUTPATIENT
Start: 2023-07-10 | End: 2023-08-09

## 2023-07-10 RX ORDER — NALTREXONE HYDROCHLORIDE 50 MG/1
50 TABLET, FILM COATED ORAL DAILY
Qty: 60 TABLET | Refills: 3 | Status: SHIPPED | OUTPATIENT
Start: 2023-07-11

## 2023-07-10 RX ORDER — ARIPIPRAZOLE 5 MG/1
5 TABLET ORAL DAILY
Qty: 60 TABLET | Refills: 3 | Status: SHIPPED | OUTPATIENT
Start: 2023-07-10

## 2023-07-10 RX ORDER — HYDROXYZINE HYDROCHLORIDE 25 MG/1
25 TABLET, FILM COATED ORAL EVERY 6 HOURS PRN
Status: DISCONTINUED | OUTPATIENT
Start: 2023-07-10 | End: 2023-07-10 | Stop reason: HOSPADM

## 2023-07-10 RX ORDER — HYDROXYZINE HYDROCHLORIDE 25 MG/1
25 TABLET, FILM COATED ORAL EVERY 6 HOURS PRN
Qty: 60 TABLET | Refills: 3 | Status: SHIPPED | OUTPATIENT
Start: 2023-07-10

## 2023-07-10 RX ADMIN — HYDROXYZINE HYDROCHLORIDE 25 MG: 25 TABLET ORAL at 10:18

## 2023-07-10 RX ADMIN — NALTREXONE HYDROCHLORIDE 50 MG: 50 TABLET, FILM COATED ORAL at 10:18

## 2023-07-10 RX ADMIN — THIAMINE HCL TAB 100 MG 500 MG: 100 TAB at 09:10

## 2023-07-10 RX ADMIN — ARIPIPRAZOLE 5 MG: 5 TABLET ORAL at 10:32

## 2023-07-10 RX ADMIN — FOLIC ACID 1 MG: 1 TABLET ORAL at 09:10

## 2023-07-10 NOTE — UTILIZATION REVIEW
Initial Clinical Review    Admission: Date/Time/Statement:   Admission Orders (From admission, onward)     Ordered        07/08/23 1747  INPATIENT ADMISSION  Once                      Orders Placed This Encounter   Procedures   • INPATIENT ADMISSION     Standing Status:   Standing     Number of Occurrences:   1     Order Specific Question:   Level of Care     Answer:   Med Surg [16]     Order Specific Question:   Estimated length of stay     Answer:   More than 2 Midnights     Order Specific Question:   Certification     Answer:   I certify that inpatient services are medically necessary for this patient for a duration of greater than two midnights. See H&P and MD Progress Notes for additional information about the patient's course of treatment. ED Arrival Information     Expected   -    Arrival   7/8/2023 16:05    Acuity   Emergent            Means of arrival   Ambulance    Escorted by   [facility] ([address] [place] [address])    Service   Hospitalist    Admission type   Emergency            Arrival complaint   alcohol withdrawal            Chief Complaint   Patient presents with   • Alcohol Intoxication     Pt reports he is withdrawing from alcohol, typically drinks 5 or 6 24 oz beers daily but has not drank since yesterday       Initial Presentation: 32 y.o. male who presented by EMS to [facility] ED. Inpatient admission for evaluation and treatment of alcohol withdrawal syndrome. Presented w/ need for detox from alcohol. Reports 6-8 cans of beer daily, last drink on 7/7 PM. On exam, unsteady gait, nausea, tremors. CIWA 9. Plan: CIWA monitoring w/ benzo management, PO thiamine/folic acid supplement, IVF, telemetry, continuous pulse ox, trend labs, replete electrolytes as needed. Medical Toxicology consulted. Date: 07/09/23       Day 2: Pt reports wanting assistance for discharge to a penitentiary house or rehab. On exam, unremarkable. CIWA 3.  Plan: continue CIWA monitoring w/ benzo management, PO thiamine/folic acid supplement, telemetry, continuous pulse ox, trend labs, replete electrolytes as needed. Medical Toxicology: Pt reports 1 handle vodka and several beers daily. Continue CIWA monitoring w/ lorazepam management. Trend labs, replete electrolytes as needed. IVF. Pt considering naltrexone.      ED Triage Vitals   Temperature Pulse Respirations Blood Pressure SpO2   07/08/23 1607 07/08/23 1607 07/08/23 1607 07/08/23 1607 07/08/23 1607   (!) 100.8 °F (38.2 °C) (!) 124 20 147/81 100 %      Temp Source Heart Rate Source Patient Position - Orthostatic VS BP Location FiO2 (%)   07/08/23 1607 07/08/23 1607 07/08/23 1607 07/08/23 1607 --   Tympanic Monitor Sitting Right arm       Pain Score       07/08/23 1820       No Pain          Wt Readings from Last 1 Encounters:   07/08/23 87.4 kg (192 lb 10.9 oz)     Vital Signs:   Date/Time Temp Pulse Resp BP MAP (mmHg) SpO2 O2 Device   07/09/23 2100 97.8 °F (36.6 °C) 76 18 115/76 82 97 % None (Room air)   07/09/23 1700 -- 61 -- 118/76 -- -- --   07/09/23 1513 97 °F (36.1 °C) Abnormal  57 18 115/69 -- 96 % None (Room air)   07/09/23 1300 97.9 °F (36.6 °C) 71 18 114/70 79 97 % None (Room air)   07/09/23 0900 98.1 °F (36.7 °C) 65 18 118/78 -- 98 % None (Room air)   07/09/23 0729 98.1 °F (36.7 °C) 65 18 112/76 84 97 % None (Room air)   07/09/23 0500 -- 57 -- -- -- -- --   07/08/23 2324 97.2 °F (36.2 °C) Abnormal  74 16 122/75 80 96 % None (Room air)   07/08/23 2100 97.3 °F (36.3 °C) Abnormal  77 16 133/83 90 -- --   07/08/23 1925 -- -- -- -- -- 95 % None (Room air)   07/08/23 1820 97.6 °F (36.4 °C) 76 18 138/89 100 98 % None (Room air)   07/08/23 1732 100 °F (37.8 °C) -- -- -- -- -- --   07/08/23 1654 -- 82 -- 124/78 -- -- --   07/08/23 1645 -- 82 11 Abnormal  124/78 -- 95 % None (Room air)       WA-Ar:    07/10/23 0500 07/10/23 0100 07/09/23 2100 07/09/23 1700   /78  -/70  -/76  -/76  -JE   Pulse 76  -SB 64  -SB 76  -SB 61  -JE   Nausea and Vomiting 0  -SB 0  -SB 0  -SB 0  -JE   Tactile Disturbances 0  -SB 0  -SB 0  -SB 0  -JE   Tremor 0  -SB 0  -SB 0  -SB 0  -JE   Auditory Disturbances 0  -SB 0  -SB 0  -SB 0  -JE   Paroxysmal Sweats 0  -SB 0  -SB 0  -SB 0  -JE   Visual Disturbances 0  -SB 0  -SB 0  -SB 0  -JE   Anxiety 0  -SB 0  -SB 0  -SB 0  -JE   Headache, Fullness in Head 0  -SB 0  -SB 0  -SB 0  -JE   Agitation 0  -SB 0  -SB 0  -SB 0  -JE   Orientation and Clouding of Sensorium 0  -SB 0  -SB 0  -SB 0  -JE   CIWA-Ar Total 0  -SB 0  -SB 0  -SB 0  -JE    07/09/23 1300 07/09/23 0900 07/09/23 0729 07/09/23 0500   /70  -/78  -/76  -CB --   Pulse 71  -CB 65  -JE 65  -CB 57  -TESSIE   Nausea and Vomiting 0  -JE 0  -JE -- 0  -TESSIE   Tactile Disturbances 0  -JE 0  -JE -- 0  -TESSIE   Tremor 0  -JE 0  -JE -- 0  -TESSIE   Auditory Disturbances 0  -JE 0  -JE -- 0  -TESSIE   Paroxysmal Sweats 0  -JE 0  -JE -- 0  -TESSIE   Visual Disturbances 0  -JE 0  -JE -- 0  -TESSIE   Anxiety 0  -JE 3  -JE -- 0  -TESSIE   Headache, Fullness in Head 0  -JE 0  -JE -- 0  -TESSIE   Agitation 0  -JE 0  -JE -- 0  -TESSIE   Orientation and Clouding of Sensorium 0  -JE 0  -JE -- 0  -TESSIE   CIWA-Ar Total 0  -JE 3  -JE -- 0  -TESSIE    07/09/23 0100 07/08/23 2100 07/08/23 1820 07/08/23 1654   /75  -/83  -TESSIE 138/89  -/78  -SA   Pulse 74  -PD 77  -TESSIE 76  -JE 82  -SA   Nausea and Vomiting 0 0  -TESSIE -- 0  -SA   Tactile Disturbances 0 0  -TESSIE -- 1  -SA   Tremor 0 0  -TESSIE -- 4  -SA   Auditory Disturbances 0 0  -TESSIE -- 0  -SA   Paroxysmal Sweats 0 0  -TESSIE -- 0  -SA   Visual Disturbances 0 0  -TESSIE -- 1  -SA   Anxiety 0 0  -TESSIE -- 3  -SA   Headache, Fullness in Head 0 0  -TESSIE -- 0  -SA   Agitation 0 0  -TESSIE -- 0  -SA   Orientation and Clouding of Sensorium 0 0  -TESSIE -- 0  -SA   CIWA-Ar Total 0 0  -TESSIE -- 9  -SA           Pertinent Labs/Diagnostic Test Results:     Results from last 7 days   Lab Units 07/09/23  0531 07/08/23  1649   WBC Thousand/uL 4.79 7.85   HEMOGLOBIN g/dL 12.8 13.0   HEMATOCRIT % 37.4 38.5 PLATELETS Thousands/uL 192 223   NEUTROS ABS Thousands/µL 2.46 6.41         Results from last 7 days   Lab Units 07/09/23  0531 07/08/23  1649   SODIUM mmol/L 139 139   POTASSIUM mmol/L 3.7 3.4*   CHLORIDE mmol/L 105 101   CO2 mmol/L 27 24   ANION GAP mmol/L 7 14   BUN mg/dL 9 9   CREATININE mg/dL 0.66 0.71   EGFR ml/min/1.73sq m 132 128   CALCIUM mg/dL 8.1* 8.7   MAGNESIUM mg/dL 2.1 1.3*   PHOSPHORUS mg/dL 3.6 1.7*     Results from last 7 days   Lab Units 07/09/23  0531 07/08/23  1649   AST U/L 43* 69*   ALT U/L 52 69*   ALK PHOS U/L 36 45   TOTAL PROTEIN g/dL 5.2* 6.2*   ALBUMIN g/dL 3.3* 3.9   TOTAL BILIRUBIN mg/dL 0.96 0.79         Results from last 7 days   Lab Units 07/09/23  0531 07/08/23  1649   GLUCOSE RANDOM mg/dL 86 95     Results from last 7 days   Lab Units 07/08/23  1939 07/08/23  1649   LACTIC ACID mmol/L 0.9 2.9*     Results from last 7 days   Lab Units 07/08/23  1649   LIPASE u/L 37         Results from last 7 days   Lab Units 07/08/23  1941   CLARITY UA  Clear   COLOR UA  Yellow   SPEC GRAV UA  1.015   PH UA  6.5   GLUCOSE UA mg/dl Negative   KETONES UA mg/dl 50 (2+)*   BLOOD UA  Negative   PROTEIN UA mg/dl Negative   NITRITE UA  Negative   BILIRUBIN UA  Negative   UROBILINOGEN UA mg/dL Negative   LEUKOCYTES UA  Negative     Results from last 7 days   Lab Units 07/08/23  1939   AMPH/METH  Negative   BARBITURATE UR  Negative   BENZODIAZEPINE UR  Negative   COCAINE UR  Negative   METHADONE URINE  Negative   OPIATE UR  Negative   PCP UR  Negative   THC UR  Positive*     Results from last 7 days   Lab Units 07/08/23  1649   ETHANOL LVL mg/dL <10         ED Treatment:   Medication Administration from 07/08/2023 1605 to 07/08/2023 1817       Date/Time Order Dose Route Action     07/08/2023 1650 EDT sodium chloride 0.9 % bolus 1,000 mL 1,000 mL Intravenous New Bag     07/08/2023 1656 EDT LORazepam (ATIVAN) injection 2 mg 2 mg Intravenous Given     07/08/2023 1613 EDT ondansetron (FOR EMS ONLY) [facility] 4 mg/2 mL injection 4 mg 0 mg Does not apply Given to EMS     07/08/2023 1614 EDT LORazepam (FOR EMS ONLY) (ATIVAN) 2 mg/mL injection 2 mg 0 mg Does not apply Given to EMS     07/08/2023 1656 EDT acetaminophen (TYLENOL) tablet 650 mg 650 mg Oral Given     07/08/2023 1803 EDT magnesium sulfate 2 g/50 mL IVPB (premix) 2 g 2 g Intravenous New Bag        History reviewed. No pertinent past medical history. Present on Admission:  • Alcohol withdrawal syndrome without complication (HCC)  • Elevated LFTs  • Alcohol use disorder, severe, dependence (HCC)  • (Resolved) Acute lactic acidosis  • (Resolved) Electrolyte abnormality      Admitting Diagnosis: Alcohol intoxication ([address]) [F10.929]  Alcohol withdrawal syndrome without complication ([address]) [G45.871]  Age/Sex: 32 y.o. male  Admission Orders:  Regular Diet. SCDs. Aspiration, Fall & Seizure Precautions. CIWA monitoring. Telemetry & Continuous Pulse Ox. Scheduled Medications:  folic acid, 1 mg, Oral, Daily  thiamine, 500 mg, Oral, Daily    Continuous IV Infusions:  sodium chloride 0.9 %, 125 mL/hr, Intravenous, Continuous    PRN Meds:  LORazepam, 2 mg, Oral, Q8H PRN; 7/9 x1  potassium chloride, 40 mEq, Oral; 7/8 x1        IP CONSULT TO TOXICOLOGY  IP CONSULT TO CASE MANAGEMENT    Network Utilization Review Department  ATTENTION: Please call with any questions or concerns to [phone] and carefully listen to the prompts so that you are directed to the right person. All voicemails are confidential.  Yossi Means all requests for admission clinical reviews, approved or denied determinations and any other requests to dedicated fax number below belonging to the campus where the patient is receiving treatment. List of dedicated fax numbers for the Facilities:  [place] DENIALS (Administrative/Medical Necessity) [phone] [address] (Maternity/NICU/Pediatrics) [phone]   [address] [phone]   [facility] [address] [facility] [phone]   [address] [facility] [address] [address] [facility] [address] [place] [address] [address]  [phone]

## 2023-07-10 NOTE — NURSING NOTE
Went over discharge papers with patient. He got medication at bedside to take to [facility]. Patient walked to lobby and picked up by facility. Education given on new medications.

## 2023-07-10 NOTE — DISCHARGE SUMMARY
Discharge Summary - [address] [place] [address]    Patient Information: Andrew Blackman 32 y.o. male MRN: [id number]  Unit/Bed#: 7T [facility] 712-01 Encounter: [id number]    Discharging Physician / Practitioner: Dr. Mi Zuleta   PCP: Jillian Galeano MD  Admission Date:   Admission Orders (From admission, onward)     Ordered        07/08/23 1747  INPATIENT ADMISSION  Once                      Discharge Date: 7/10/2023    Reason for Admission: alcohol withdraw     Discharge Diagnoses:     Principal Problem (Resolved):    Alcohol withdrawal syndrome without complication ([address])  Active Problems:    Alcohol use disorder, severe, dependence ([address])  Resolved Problems:    Elevated LFTs    Acute lactic acidosis    Electrolyte abnormality            * Alcohol withdrawal syndrome without complication ([address])  Assessment & Plan   serum alcohol 462 on admission. Averages about 6-8 beer cans daily along with liquor  Last drink was yesterday 7/7/23  Was accepted to [facility] for inpatient rehab services, patient is amendable to go. Plan   -Continue Naltrexone 50 mg daily   - Continue thiamine and folic acid for one month   - Continue atarax and Abilify for depression and anxiety secondary to withdraw.      Alcohol use disorder, severe, dependence ([address])  Assessment & Plan  See A/P for alcohol withdrawal syndrome      Electrolyte abnormality-resolved as of 7/9/2023  Assessment & Plan  Lab Results   Component Value Date    K 3.7 07/09/2023     Magnesium   Date Value Ref Range Status   07/09/2023 2.1 1.9 - 2.7 mg/dL Final     Resolved     Acute lactic acidosis-resolved as of 7/9/2023  Assessment & Plan  Today 0.9 resolved   Received 1L NS bolus in the ED    - [address] [place] [address] Stay:  · Toxicology    Procedures Performed:   · None     Significant Findings / Test Results:   · none    Incidental Findings:   · none     Test Results Pending at Discharge (will require follow up):   · none     Outpatient Tests Requested:  · none    Outpatient follow-up Requested:  • [facility]ARE    Complications:  none    Hospital Course:     Yeni Tovar is a 32 y.o. male past medical history notable for alcohol abuse who originally presented to the hospital on 7/8/2023 with  tremors, tingly sensation on hands and face, bilateral hand stiffness Concerning for alcohol with draw. Patient was admitted and placed on the CIWA protocol. On the day of discharge patient had a CIWA score of 0 for 48 hours. Patient was amedable to rehab and sent to a treatment facility. Review of Systems   Constitutional: Negative for appetite change, chills, fatigue and fever. HENT: Negative for congestion, ear discharge and ear pain. Eyes: Negative for pain and redness. Respiratory: Negative for cough, shortness of breath and wheezing. Cardiovascular: Negative for chest pain, palpitations and leg swelling. Gastrointestinal: Negative for abdominal distention, abdominal pain, blood in stool, constipation, diarrhea, nausea and vomiting. Endocrine: Negative for polydipsia, polyphagia and polyuria. Genitourinary: Negative for difficulty urinating, dysuria, frequency, hematuria and urgency. Musculoskeletal: Negative for arthralgias, back pain, myalgias and neck pain. Neurological: Negative for dizziness, tremors, weakness, light-headedness, numbness and headaches. Condition at Discharge: good     Discharge Day Visit / Exam:     Vitals: Blood Pressure: 117/84 (07/10/23 0736)  Pulse: 81 (07/10/23 0736)  Temperature: (!) 96.6 °F (35.9 °C) (07/10/23 0736)  Temp Source: Temporal (07/10/23 0736)  Respirations: 18 (07/10/23 0736)  Height: 5' 7" (170.2 cm) (07/08/23 1820)  Weight - Scale: 87.4 kg (192 lb 10.9 oz) (07/08/23 1820)  SpO2: 99 % (07/10/23 0736)  Exam:   Physical Exam  Vitals reviewed. Constitutional:       Appearance: Normal appearance. HENT:      Head: Normocephalic.       Nose: Nose normal.   Eyes:      Conjunctiva/sclera: Conjunctivae normal.   Cardiovascular:      Rate and Rhythm: Normal rate and regular rhythm. Pulses: Normal pulses. Heart sounds: No murmur heard. No gallop. Pulmonary:      Effort: Pulmonary effort is normal.      Breath sounds: Normal breath sounds. Abdominal:      General: Abdomen is flat. Bowel sounds are normal.   Musculoskeletal:         General: Normal range of motion. Skin:     General: Skin is warm. Capillary Refill: Capillary refill takes less than 2 seconds. Neurological:      General: No focal deficit present. Mental Status: He is alert. Discharge instructions/Information to patient and family:   See after visit summary for information provided to patient and family. Discharge Medications:  Current Outpatient Medications   Medication Instructions   • ARIPiprazole (ABILIFY) 5 mg, Oral, Daily   • [START ON 6/54/4776] folic acid (FOLVITE) 1 mg, Oral, Daily   • hydrOXYzine HCL (ATARAX) 25 mg, Oral, Every 6 hours PRN   • [START ON 7/11/2023] naltrexone (REVIA) 50 mg, Oral, Daily   • vitamin B-1 250 mg, Oral, Daily         Provisions for Follow-Up Care:  See after visit summary for information related to follow-up care and any pertinent home health orders. Disposition:     Home    For Discharges to [facility] SNF:   · Not Applicable to this Patient - Not Applicable to this Patient    Planned Readmission: no plans for re admissions at this time. Appropriate follow up discharge was made. Discharge Statement:  I spent 45 minutes discharging the patient. This time was spent on the day of discharge. I had direct contact with the patient on the day of discharge. Greater than 50% of the total time was spent examining patient, answering all patient questions, arranging and discussing plan of care with patient as well as directly providing post-discharge instructions. Additional time then spent on discharge activities.         Ha Penaloza,   07/10/23  11:16 AM

## 2023-07-10 NOTE — PLAN OF CARE
Problem: PAIN - ADULT  Goal: Verbalizes/displays adequate comfort level or baseline comfort level  Description: Interventions:  - Encourage patient to monitor pain and request assistance  - Assess pain using appropriate pain scale  - Administer analgesics based on type and severity of pain and evaluate response  - Implement non-pharmacological measures as appropriate and evaluate response  - Consider cultural and social influences on pain and pain management  - Notify physician/advanced practitioner if interventions unsuccessful or patient reports new pain  Outcome: Progressing     Problem: INFECTION - ADULT  Goal: Absence or prevention of progression during hospitalization  Description: INTERVENTIONS:  - Assess and monitor for signs and symptoms of infection  - Monitor lab/diagnostic results  - Monitor all insertion sites, i.e. indwelling lines, tubes, and drains  - Monitor endotracheal if appropriate and nasal secretions for changes in amount and color  - [place] appropriate cooling/warming therapies per order  - Administer medications as ordered  - Instruct and encourage patient and family to use good hand hygiene technique  - Identify and instruct in appropriate isolation precautions for identified infection/condition  Outcome: Progressing  Goal: Absence of fever/infection during neutropenic period  Description: INTERVENTIONS:  - Monitor WBC    Outcome: Progressing     Problem: SAFETY ADULT  Goal: Patient will remain free of falls  Description: INTERVENTIONS:  - Educate patient/family on patient safety including physical limitations  - Instruct patient to call for assistance with activity   - Consult OT/PT to assist with strengthening/mobility   - Keep Call bell within reach  - Keep bed low and locked with side rails adjusted as appropriate  - Keep care items and personal belongings within reach  - Initiate and maintain comfort rounds  - Make Fall Risk Sign visible to staff  - Apply yellow socks and bracelet for high fall risk patients  - Consider moving patient to room near nurses station  Outcome: Progressing  Goal: Maintain or return to baseline ADL function  Description: INTERVENTIONS:  -  Assess patient's ability to carry out ADLs; assess patient's baseline for ADL function and identify physical deficits which impact ability to perform ADLs (bathing, care of mouth/teeth, toileting, grooming, dressing, etc.)  - Assess/evaluate cause of self-care deficits   - Assess range of motion  - Assess patient's mobility; develop plan if impaired  - Assess patient's need for assistive devices and provide as appropriate  - Encourage maximum independence but intervene and supervise when necessary  - Involve family in performance of ADLs  - Assess for home care needs following discharge   - Consider OT consult to assist with ADL evaluation and planning for discharge  - Provide patient education as appropriate  Outcome: Progressing  Goal: Maintains/Returns to pre admission functional level  Description: INTERVENTIONS:  - Perform BMAT or MOVE assessment daily.   - Set and communicate daily mobility goal to care team and patient/family/caregiver.    - Collaborate with rehabilitation services on mobility goals if consulted  - Out of bed for toileting  - Record patient progress and toleration of activity level   Outcome: Progressing     Problem: DISCHARGE PLANNING  Goal: Discharge to home or other facility with appropriate resources  Description: INTERVENTIONS:  - Identify barriers to discharge w/patient and caregiver  - Arrange for needed discharge resources and transportation as appropriate  - Identify discharge learning needs (meds, wound care, etc.)  - Arrange for interpretive services to assist at discharge as needed  - Refer to Case Management Department for coordinating discharge planning if the patient needs post-hospital services based on physician/advanced practitioner order or complex needs related to functional status, cognitive ability, or social support system  Outcome: Progressing     Problem: Knowledge Deficit  Goal: Patient/family/caregiver demonstrates understanding of disease process, treatment plan, medications, and discharge instructions  Description: Complete learning assessment and assess knowledge base.   Interventions:  - Provide teaching at level of understanding  - Provide teaching via preferred learning methods  Outcome: Progressing

## 2023-07-11 ENCOUNTER — TRANSITIONAL CARE MANAGEMENT (OUTPATIENT)
Dept: FAMILY MEDICINE CLINIC | Facility: CLINIC | Age: 28
End: 2023-07-11

## 2023-07-11 NOTE — UTILIZATION REVIEW
NOTIFICATION OF ADMISSION DISCHARGE   This is a Notification of Discharge from [address] [place] [address]. Please be advised that this patient has been discharge from our facility. Below you will find the admission and discharge date and time including the patient’s disposition. UTILIZATION REVIEW CONTACT:  Marcia Renteria MA  Utilization   Network Utilization Review Department  Phone: [phone] x carefully listen to the prompts. All voicemails are confidential.  Email: [email or web address]     ADMISSION INFORMATION  PRESENTATION DATE: 7/8/2023  4:07 PM  OBERVATION ADMISSION DATE:   INPATIENT ADMISSION DATE: 7/8/23  5:48 PM   DISCHARGE DATE: 7/10/2023  1:20 PM   [facility]    IMPORTANT INFORMATION:  Send all requests for admission clinical reviews, approved or denied determinations and any other requests to dedicated fax number below belonging to the campus where the patient is receiving treatment.  List of dedicated fax numbers:  [place] DENIALS (Administrative/Medical Necessity) [phone] [address] [facility] (Maternity/NICU/Pediatrics) [phone]   [facility] [phone]   [facility] [phone] [address] [facility] [phone]   [address] [place] [address] [phone]   [facility] [phone]   [address] [place] [address] [address] [facility] [phone]   [address] [place] [address] [phone]   [address] [facility] [phone]   [address] [facility] [address] [facility] [phone]

## 2023-07-11 NOTE — CASE MANAGEMENT
Case Management Assessment & Discharge Planning Note    Patient name Gilmar Melton  Location 7T [facility] 712/7T [facility] 712-01 MRN [id number]  : 1995 Date 2023       Current Admission Date: 2023  Current Admission Diagnosis:Alcohol use disorder, severe, dependence [facility])  Patient Active Problem List    Diagnosis Date Noted   • Alcohol use disorder, severe, dependence ([address]) 2023   • Alcohol intoxication ([address] ) 2023   • Depression 2023   • Class 1 obesity due to excess calories without serious comorbidity with body mass index (BMI) of 31.0 to 31.9 in adult 2023   • 's permit PE (physical examination) 2023      LOS (days): 2  Geometric Mean LOS (GMLOS) (days):   Days to GMLOS:     OBJECTIVE:  PATIENT READMITTED TO HOSPITAL  Risk of Unplanned Readmission Score: 13.27         Current admission status: Inpatient       Preferred Pharmacy:   [address] [place] [address]  Phone: [phone] Fax: [phone]    Primary Care Provider: Alireza Walker MD    Primary Insurance: Reina Brice  Secondary Insurance:     ASSESSMENT:  [place] [employer]xies    There are no active Health Care Proxies on file.        DISCHARGE DETAILS:    Discharge planning discussed with[de-identified] pt  Freedom of Choice: Yes  Comments - Freedom of Choice: Pt agreed for inpatient Alcohol treatment for inpatient detox  CM contacted family/caregiver?: No- see comments  Were Treatment Team discharge recommendations reviewed with patient/caregiver?: Yes  Did patient/caregiver verbalize understanding of patient care needs?: Yes  Were patient/caregiver advised of the risks associated with not following Treatment Team discharge recommendations?: Yes    Contacts  Patient Contacts: Gricelda García  Relationship to Patient[de-identified] Treatment Provider  Reason/Outcome: Continuity of Care, Discharge  [facility]         Is the patient interested in [facility] at discharge?: No    DME Referral Provided  Referral made for DME?: No    Other Referral/Resources/Interventions Provided:  Interventions: Substance Abuse Treatment  Referral Comments: Pt is accepted at [address] [facility]; transportation  at 1300. Pt is meds to beds . CM delivered meds to beds. CM was notified by detox CM that pt will need authorization approval for inpatient alcohol treatment. CM called pt's insurance to submit for auth. Pt is approved for 16 days starting 7/10-July/26.          Treatment Team Recommendation: Substance Abuse Treatment  Discharge Destination Plan[de-identified] Substance Abuse Treatment  Transport at Discharge : Auto with designated  [employer] transportation  at 1300)

## 2023-07-13 ENCOUNTER — PATIENT OUTREACH (OUTPATIENT)
Dept: FAMILY MEDICINE CLINIC | Facility: CLINIC | Age: 28
End: 2023-07-13

## 2023-07-13 NOTE — LETTER
[facility], [address] [place] [address],[address]  [phone]    Re: Care coordination   7/13/2023       Dear Mariam Smalls,    We tried to reach you by phone on 7/7/23 and 7/13/23 was unfortunately unable to reach you. It is important that you contact the office as soon as possible at: [phone].     Sincerely,         [address]

## 2023-07-13 NOTE — PROGRESS NOTES
MARSHAL COTTO did place a second call to Pt but Pt did not  the phone. MARSHAL COTTO was unable to leave a VM due to Pt's phone it's not available to receive message at this time. MARSHAL COTTO will send Unable to Reach Letter. MARSHAL COTTO will close this case today due to unable to contact Pt. MARSHAL COTTO is remain available for further assistance as needed.

## 2024-01-14 ENCOUNTER — APPOINTMENT (EMERGENCY)
Dept: RADIOLOGY | Facility: HOSPITAL | Age: 29
End: 2024-01-14
Payer: COMMERCIAL

## 2024-01-14 ENCOUNTER — HOSPITAL ENCOUNTER (EMERGENCY)
Facility: HOSPITAL | Age: 29
Discharge: LEFT BEFORE DISCHARGE | End: 2024-01-14
Attending: SURGERY
Payer: COMMERCIAL

## 2024-01-14 VITALS
TEMPERATURE: 98.2 F | SYSTOLIC BLOOD PRESSURE: 100 MMHG | OXYGEN SATURATION: 96 % | RESPIRATION RATE: 18 BRPM | HEART RATE: 80 BPM | DIASTOLIC BLOOD PRESSURE: 60 MMHG
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
## 2024-01-14 LAB
ABO GROUP BLD: NORMAL
ABO GROUP BLD: NORMAL
ANION GAP SERPL CALCULATED.3IONS-SCNC: 17 MMOL/L
BASE EXCESS BLDA CALC-SCNC: 2 MMOL/L (ref -2–3)
BASOPHILS # BLD AUTO: 0.06 THOUSANDS/ÂΜL (ref 0–0.1)
BASOPHILS NFR BLD AUTO: 1 % (ref 0–1)
BLD GP AB SCN SERPL QL: NEGATIVE
BUN SERPL-MCNC: 14 MG/DL (ref 5–25)
CA-I BLD-SCNC: 0.98 MMOL/L (ref 1.12–1.32)
CALCIUM SERPL-MCNC: 8.6 MG/DL (ref 8.4–10.2)
CHLORIDE SERPL-SCNC: 104 MMOL/L (ref 96–108)
CO2 SERPL-SCNC: 23 MMOL/L (ref 21–32)
CREAT SERPL-MCNC: 0.85 MG/DL (ref 0.6–1.3)
EOSINOPHIL # BLD AUTO: 0.09 THOUSAND/ÂΜL (ref 0–0.61)
EOSINOPHIL NFR BLD AUTO: 1 % (ref 0–6)
ERYTHROCYTE [DISTWIDTH] IN BLOOD BY AUTOMATED COUNT: 13 % (ref 11.6–15.1)
ETHANOL SERPL-MCNC: 416 MG/DL
GFR SERPL CREATININE-BSD FRML MDRD: 118 ML/MIN/1.73SQ M
GLUCOSE SERPL-MCNC: 105 MG/DL (ref 65–140)
GLUCOSE SERPL-MCNC: 98 MG/DL (ref 65–140)
HCO3 BLDA-SCNC: 27.5 MMOL/L (ref 24–30)
HCT VFR BLD AUTO: 47.9 % (ref 36.5–49.3)
HCT VFR BLD CALC: 49 % (ref 36.5–49.3)
HGB BLD-MCNC: 16.6 G/DL (ref 12–17)
HGB BLDA-MCNC: 16.7 G/DL (ref 12–17)
IMM GRANULOCYTES # BLD AUTO: 0.02 THOUSAND/UL (ref 0–0.2)
IMM GRANULOCYTES NFR BLD AUTO: 0 % (ref 0–2)
LACTATE SERPL-SCNC: 1.2 MMOL/L (ref 0.5–2)
LACTATE SERPL-SCNC: 2.5 MMOL/L (ref 0.5–2)
LYMPHOCYTES # BLD AUTO: 3.52 THOUSANDS/ÂΜL (ref 0.6–4.47)
LYMPHOCYTES NFR BLD AUTO: 41 % (ref 14–44)
MCH RBC QN AUTO: 33.3 PG (ref 26.8–34.3)
MCHC RBC AUTO-ENTMCNC: 34.7 G/DL (ref 31.4–37.4)
MCV RBC AUTO: 96 FL (ref 82–98)
MONOCYTES # BLD AUTO: 0.92 THOUSAND/ÂΜL (ref 0.17–1.22)
MONOCYTES NFR BLD AUTO: 11 % (ref 4–12)
NEUTROPHILS # BLD AUTO: 3.94 THOUSANDS/ÂΜL (ref 1.85–7.62)
NEUTS SEG NFR BLD AUTO: 46 % (ref 43–75)
NRBC BLD AUTO-RTO: 0 /100 WBCS
PCO2 BLD: 29 MMOL/L (ref 21–32)
PCO2 BLD: 45.9 MM HG (ref 42–50)
PH BLD: 7.39 [PH] (ref 7.3–7.4)
PLATELET # BLD AUTO: 244 THOUSANDS/UL (ref 149–390)
PMV BLD AUTO: 10.5 FL (ref 8.9–12.7)
PO2 BLD: 44 MM HG (ref 35–45)
POTASSIUM BLD-SCNC: 3.6 MMOL/L (ref 3.5–5.3)
POTASSIUM SERPL-SCNC: 3.9 MMOL/L (ref 3.5–5.3)
RBC # BLD AUTO: 4.99 MILLION/UL (ref 3.88–5.62)
RH BLD: POSITIVE
RH BLD: POSITIVE
SAO2 % BLD FROM PO2: 79 % (ref 60–85)
SODIUM BLD-SCNC: 142 MMOL/L (ref 136–145)
SODIUM SERPL-SCNC: 144 MMOL/L (ref 135–147)
SPECIMEN EXPIRATION DATE: NORMAL
SPECIMEN SOURCE: ABNORMAL
WBC # BLD AUTO: 8.55 THOUSAND/UL (ref 4.31–10.16)

## 2024-01-14 PROCEDURE — 76604 US EXAM CHEST: CPT | Performed by: SURGERY

## 2024-01-14 PROCEDURE — 74177 CT ABD & PELVIS W/CONTRAST: CPT

## 2024-01-14 PROCEDURE — 82330 ASSAY OF CALCIUM: CPT

## 2024-01-14 PROCEDURE — 83605 ASSAY OF LACTIC ACID: CPT | Performed by: SURGERY

## 2024-01-14 PROCEDURE — 71045 X-RAY EXAM CHEST 1 VIEW: CPT

## 2024-01-14 PROCEDURE — 82803 BLOOD GASES ANY COMBINATION: CPT

## 2024-01-14 PROCEDURE — 71260 CT THORAX DX C+: CPT

## 2024-01-14 PROCEDURE — 86900 BLOOD TYPING SEROLOGIC ABO: CPT | Performed by: SURGERY

## 2024-01-14 PROCEDURE — EDAIR PR ED AIR: Performed by: EMERGENCY MEDICINE

## 2024-01-14 PROCEDURE — 82077 ASSAY SPEC XCP UR&BREATH IA: CPT | Performed by: SURGERY

## 2024-01-14 PROCEDURE — 36415 COLL VENOUS BLD VENIPUNCTURE: CPT | Performed by: STUDENT IN AN ORGANIZED HEALTH CARE EDUCATION/TRAINING PROGRAM

## 2024-01-14 PROCEDURE — 82947 ASSAY GLUCOSE BLOOD QUANT: CPT

## 2024-01-14 PROCEDURE — 85014 HEMATOCRIT: CPT

## 2024-01-14 PROCEDURE — 86850 RBC ANTIBODY SCREEN: CPT | Performed by: SURGERY

## 2024-01-14 PROCEDURE — 84132 ASSAY OF SERUM POTASSIUM: CPT

## 2024-01-14 PROCEDURE — 70450 CT HEAD/BRAIN W/O DYE: CPT

## 2024-01-14 PROCEDURE — 85025 COMPLETE CBC W/AUTO DIFF WBC: CPT | Performed by: SURGERY

## 2024-01-14 PROCEDURE — 80048 BASIC METABOLIC PNL TOTAL CA: CPT | Performed by: SURGERY

## 2024-01-14 PROCEDURE — 86901 BLOOD TYPING SEROLOGIC RH(D): CPT | Performed by: SURGERY

## 2024-01-14 PROCEDURE — 84295 ASSAY OF SERUM SODIUM: CPT

## 2024-01-14 PROCEDURE — 76705 ECHO EXAM OF ABDOMEN: CPT | Performed by: SURGERY

## 2024-01-14 PROCEDURE — 99284 EMERGENCY DEPT VISIT MOD MDM: CPT

## 2024-01-14 PROCEDURE — 93308 TTE F-UP OR LMTD: CPT | Performed by: SURGERY

## 2024-01-14 PROCEDURE — 96374 THER/PROPH/DIAG INJ IV PUSH: CPT

## 2024-01-14 PROCEDURE — 72125 CT NECK SPINE W/O DYE: CPT

## 2024-01-14 PROCEDURE — 96361 HYDRATE IV INFUSION ADD-ON: CPT

## 2024-01-14 RX ORDER — ONDANSETRON 2 MG/ML
INJECTION INTRAMUSCULAR; INTRAVENOUS
Status: COMPLETED
Start: 2024-01-14 | End: 2024-01-14

## 2024-01-14 RX ORDER — ONDANSETRON 2 MG/ML
4 INJECTION INTRAMUSCULAR; INTRAVENOUS ONCE
Status: COMPLETED | OUTPATIENT
Start: 2024-01-14 | End: 2024-01-14

## 2024-01-14 RX ADMIN — SODIUM CHLORIDE 1000 ML: 0.9 INJECTION, SOLUTION INTRAVENOUS at 01:34

## 2024-01-14 RX ADMIN — IOHEXOL 100 ML: 350 INJECTION, SOLUTION INTRAVENOUS at 01:01

## 2024-01-14 RX ADMIN — ONDANSETRON 4 MG: 2 INJECTION INTRAMUSCULAR; INTRAVENOUS at 01:16
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
## 2024-01-14 NOTE — INCIDENTAL FINDINGS
The following findings require follow up:  Radiographic finding   Findin. Hepatic steatosis    Follow up required: Yes   Follow up should be done within 2-4 week(s)    Please notify the following clinician to assist with the follow up:   Primary Care Provider.  Attempted to notify patient through number provided in chart however unable to notify and voicemail box is not established.  Will send certified letter to patient regarding the above incidental finding of hepatic steatosis.
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
## 2024-01-17 ENCOUNTER — TELEPHONE (OUTPATIENT)
Dept: SURGERY | Facility: CLINIC | Age: 29
End: 2024-01-17

## 2024-01-17 NOTE — TELEPHONE ENCOUNTER
Sent letter certified mail for patient to call our office for incidental findings per Jarad Chambers.    mb